# Patient Record
Sex: FEMALE | Race: WHITE | NOT HISPANIC OR LATINO
[De-identification: names, ages, dates, MRNs, and addresses within clinical notes are randomized per-mention and may not be internally consistent; named-entity substitution may affect disease eponyms.]

---

## 2024-03-15 ENCOUNTER — APPOINTMENT (OUTPATIENT)
Dept: PULMONOLOGY | Facility: CLINIC | Age: 64
End: 2024-03-15
Payer: COMMERCIAL

## 2024-03-15 ENCOUNTER — TRANSCRIPTION ENCOUNTER (OUTPATIENT)
Age: 64
End: 2024-03-15

## 2024-03-15 VITALS
WEIGHT: 230 LBS | DIASTOLIC BLOOD PRESSURE: 88 MMHG | HEART RATE: 83 BPM | TEMPERATURE: 98 F | OXYGEN SATURATION: 96 % | BODY MASS INDEX: 36.96 KG/M2 | SYSTOLIC BLOOD PRESSURE: 136 MMHG | HEIGHT: 66 IN

## 2024-03-15 DIAGNOSIS — Z80.6 FAMILY HISTORY OF LEUKEMIA: ICD-10-CM

## 2024-03-15 DIAGNOSIS — Z82.49 FAMILY HISTORY OF ISCHEMIC HEART DISEASE AND OTHER DISEASES OF THE CIRCULATORY SYSTEM: ICD-10-CM

## 2024-03-15 DIAGNOSIS — Z86.79 PERSONAL HISTORY OF OTHER DISEASES OF THE CIRCULATORY SYSTEM: ICD-10-CM

## 2024-03-15 DIAGNOSIS — R09.89 OTHER SPECIFIED SYMPTOMS AND SIGNS INVOLVING THE CIRCULATORY AND RESPIRATORY SYSTEMS: ICD-10-CM

## 2024-03-15 DIAGNOSIS — Z87.898 PERSONAL HISTORY OF OTHER SPECIFIED CONDITIONS: ICD-10-CM

## 2024-03-15 PROBLEM — Z00.00 ENCOUNTER FOR PREVENTIVE HEALTH EXAMINATION: Status: ACTIVE | Noted: 2024-03-15

## 2024-03-15 PROCEDURE — 99205 OFFICE O/P NEW HI 60 MIN: CPT | Mod: 25

## 2024-03-15 RX ORDER — AMLODIPINE BESYLATE 10 MG/1
10 TABLET ORAL
Refills: 0 | Status: ACTIVE | COMMUNITY

## 2024-03-15 RX ORDER — FLUTICASONE PROPIONATE AND SALMETEROL 50; 250 UG/1; UG/1
250-50 POWDER RESPIRATORY (INHALATION)
Refills: 0 | Status: DISCONTINUED | COMMUNITY
End: 2024-03-15

## 2024-03-15 RX ORDER — ALBUTEROL SULFATE 90 UG/1
108 (90 BASE) INHALANT RESPIRATORY (INHALATION)
Qty: 1 | Refills: 5 | Status: ACTIVE | COMMUNITY
Start: 2024-03-15 | End: 1900-01-01

## 2024-03-15 NOTE — CONSULT LETTER
[Dear  ___] : Dear  [unfilled], [Courtesy Letter:] : I had the pleasure of seeing your patient, [unfilled], in my office today. [Please see my note below.] : Please see my note below. [Consult Closing:] : Thank you very much for allowing me to participate in the care of this patient.  If you have any questions, please do not hesitate to contact me. [Sincerely,] : Sincerely, [FreeTextEntry3] : Kavon Quintana,  Pulmonary & Critical Care Medicine [facility]

## 2024-03-15 NOTE — HISTORY OF PRESENT ILLNESS
[Former] : former [Never] : never [TextBox_4] : ~age~yo woman w/ PMH asthma and  WTC exposure who presents for pulmonary evaluation of multiple nodules found during hospitalization in 2023 while undergoing workup of chest pain.   Was hospitalized mid Dec 2023 at [facility] ([facility]) in [place] for CP and underwent workup for NSTEMI including CCTA and CTAPE which revealed multiple solid pulmonary nodules and raised concern of metastatic disease. Per pt she had issues with her troponin level but cardiac workup ultimately was reassuring for non-ischemic disease. She was also found with COVID-19 at that time. She was evaluated by pulmonary, ID and oncology during her hospitalization and there was recommendation she undergo further workup of the nodules including pelvic examinations to assess for occult sources of malignancy. She deferred most of this workup to when she could follow-up with her outpatient PMD.  In the interim, she is up to date on Mammo and PAP screening. Other than briefly smoking as a teenager, she has not smoked cigarettes for the entirety of her adult life. She denies unintentional weight loss or constitutional/B-symptoms. She denies occupational inhalational exposures and denies radon gas exposure. She was exposed to dust during  and says is enrolled in the WTC program but has not been evaluated by providers within the screening program. Denies prior CT chest imaging. Denies personal hx of malignancy. Father  of leukemia but was related to his work developing nuclear weaponry during WW2/Cold War. No lung CA hx in family.   For her asthma, she has been maintained on Advair 250/50 for years which she only uses once a day due to throat irritation and says this generally keeps her controlled. Prior to Advair, she needed to use rescue albuterol multiple times per day. Has not had PFTs in recent memory nor seen pulmonologist for asthma. Has not needed OTONIEL inhaler for years but keeps an old one in her bag. Has not needed oral steroid for asthma in at least 2 years.   SH: Never smoked as an adult, briefly as a teenager Currently works as an  with insurance company Has  WT dust exposure

## 2024-03-15 NOTE — ASSESSMENT
[FreeTextEntry1] : ~age~yo woman w/ PMH asthma and 9/11 WTC exposure who presents for pulmonary evaluation of multiple nodules found during hospitalization in 12/2023 while undergoing workup of chest pain.   Data Reviewed: CCTA ([facility], 12/18/23) - scattered pulmonary nodules measuring up to 0.8cm (RLL), 0.7cm (inferior RUL), patchy lucencies bilaterally suggesting air trapping; no mediastinal or hilar adenopathy CTA PE ([facility], 12/15/23) - no mediastinal or hilar adenopathy, multiple bilateral pulmonary nodules, largest 1.3cm in LLL solid and round, 0.8cm RLL solid, and 0.7cm RUL solid; bilateral air trapping; no PE Epic MyChart data provided by patient (12/2023 hospitalization)  Reviewed images on provided DVD with reports. Multiple bilateral solid nodules with dominant nodule appearing ~1.3cm and other subcentimeter nodules. Reassuringly a non-smoker, however does have WTC dust exposure. These may indeed be benign granulomas i.e. related to WTC dust exposure and to that end she denies constitutional/B-symptoms/unintentional weight loss. Cannot exclude metastasis from distant occult site. Other ddx includes but not limited to bronchogenic granulomatous dz, DIPNECH given presence of airtrapping, sarcoidosis, etc.  Impression: #Multiple pulmonary nodules - cannot exclude malignancy/metastatic disease #Asthma #Pulmonary air trapping  Plan: - extensive image review and prior hospitalization record review during visit today - will seek FDG PET/CT to better assess multiple nodules and eval potential occult sources of malignancy - patient was thoroughly counseled regarding the imaging findings, potential etiologies, necessary workup and investigative studies and potential management options including biopsy or further surveillance and she was in agreement with plan for PET imaging - will eventually need full PFT given her asthma and no recent testing, will order at next f/u visit - switch Advair 250/50 to Breo Ellipta 200/50 1 puff daily with mouth rinsing given her issues taking Advair twice a day - appropriate Ellipta inhaler technique was demonstrated during office visit today - rx for new Albuterol MDI pump provided - will update PMD  RTO after imaging eval and also to get PFT

## 2024-03-27 ENCOUNTER — APPOINTMENT (OUTPATIENT)
Dept: NUCLEAR MEDICINE | Facility: CLINIC | Age: 64
End: 2024-03-27
Payer: COMMERCIAL

## 2024-03-27 PROCEDURE — A9552: CPT

## 2024-03-27 PROCEDURE — 78815 PET IMAGE W/CT SKULL-THIGH: CPT | Mod: PI

## 2024-03-28 ENCOUNTER — TRANSCRIPTION ENCOUNTER (OUTPATIENT)
Age: 64
End: 2024-03-28

## 2024-03-29 ENCOUNTER — APPOINTMENT (OUTPATIENT)
Dept: PULMONOLOGY | Facility: CLINIC | Age: 64
End: 2024-03-29
Payer: COMMERCIAL

## 2024-03-29 ENCOUNTER — NON-APPOINTMENT (OUTPATIENT)
Age: 64
End: 2024-03-29

## 2024-03-29 DIAGNOSIS — R59.0 LOCALIZED ENLARGED LYMPH NODES: ICD-10-CM

## 2024-03-29 PROCEDURE — 99443: CPT

## 2024-04-01 ENCOUNTER — TRANSCRIPTION ENCOUNTER (OUTPATIENT)
Age: 64
End: 2024-04-01

## 2024-04-02 ENCOUNTER — APPOINTMENT (OUTPATIENT)
Dept: THORACIC SURGERY | Facility: CLINIC | Age: 64
End: 2024-04-02
Payer: COMMERCIAL

## 2024-04-02 VITALS
BODY MASS INDEX: 36.96 KG/M2 | HEIGHT: 66 IN | OXYGEN SATURATION: 96 % | HEART RATE: 85 BPM | WEIGHT: 230 LBS | DIASTOLIC BLOOD PRESSURE: 89 MMHG | SYSTOLIC BLOOD PRESSURE: 140 MMHG

## 2024-04-02 DIAGNOSIS — Z80.52 FAMILY HISTORY OF MALIGNANT NEOPLASM OF BLADDER: ICD-10-CM

## 2024-04-02 PROCEDURE — 99205 OFFICE O/P NEW HI 60 MIN: CPT

## 2024-04-03 ENCOUNTER — APPOINTMENT (OUTPATIENT)
Dept: PULMONOLOGY | Facility: CLINIC | Age: 64
End: 2024-04-03

## 2024-04-03 ENCOUNTER — APPOINTMENT (OUTPATIENT)
Dept: PULMONOLOGY | Facility: CLINIC | Age: 64
End: 2024-04-03
Payer: COMMERCIAL

## 2024-04-03 VITALS
HEART RATE: 89 BPM | WEIGHT: 230 LBS | BODY MASS INDEX: 36.96 KG/M2 | DIASTOLIC BLOOD PRESSURE: 86 MMHG | HEIGHT: 66 IN | SYSTOLIC BLOOD PRESSURE: 139 MMHG | OXYGEN SATURATION: 97 %

## 2024-04-03 PROCEDURE — 94729 DIFFUSING CAPACITY: CPT

## 2024-04-03 PROCEDURE — 94060 EVALUATION OF WHEEZING: CPT

## 2024-04-03 PROCEDURE — 94726 PLETHYSMOGRAPHY LUNG VOLUMES: CPT

## 2024-04-03 NOTE — PHYSICAL EXAM
[Restricted in physically strenuous activity but ambulatory and able to carry out work of a light or sedentary nature] : Status 1- Restricted in physically strenuous activity but ambulatory and able to carry out work of a light or sedentary nature, e.g., light house work, office work [General Appearance - Alert] : alert [Sclera] : the sclera and conjunctiva were normal [General Appearance - In No Acute Distress] : in no acute distress [Extraocular Movements] : extraocular movements were intact [PERRL With Normal Accommodation] : pupils were equal in size, round, and reactive to light [Outer Ear] : the ears and nose were normal in appearance [Oropharynx] : the oropharynx was normal [Neck Appearance] : the appearance of the neck was normal [Auscultation Breath Sounds / Voice Sounds] : lungs were clear to auscultation bilaterally [Heart Rate And Rhythm] : heart rate was normal and rhythm regular [Examination Of The Chest] : the chest was normal in appearance [Chest Visual Inspection Thoracic Asymmetry] : no chest asymmetry [Diminished Respiratory Excursion] : normal chest expansion [2+] : left 2+ [Bowel Sounds] : normal bowel sounds [Breast Appearance] : normal in appearance [Abdomen Soft] : soft [Cervical Lymph Nodes Enlarged Posterior Bilaterally] : posterior cervical [Cervical Lymph Nodes Enlarged Anterior Bilaterally] : anterior cervical [Supraclavicular Lymph Nodes Enlarged Bilaterally] : supraclavicular [Axillary Lymph Nodes Enlarged Bilaterally] : axillary [No Spinal Tenderness] : no spinal tenderness [No CVA Tenderness] : no ~M costovertebral angle tenderness [Abnormal Walk] : normal gait [Nail Clubbing] : no clubbing  or cyanosis of the fingernails [Motor Tone] : muscle strength and tone were normal [Musculoskeletal - Swelling] : no joint swelling seen [Skin Turgor] : normal skin turgor [Skin Color & Pigmentation] : normal skin color and pigmentation [] : no rash [Sensation] : the sensory exam was normal to light touch and pinprick [No Focal Deficits] : no focal deficits [Deep Tendon Reflexes (DTR)] : deep tendon reflexes were 2+ and symmetric [Impaired Insight] : insight and judgment were intact [Oriented To Time, Place, And Person] : oriented to person, place, and time [Affect] : the affect was normal [FreeTextEntry1] : Deferred

## 2024-04-03 NOTE — HISTORY OF PRESENT ILLNESS
[FreeTextEntry1] : Ms. MICAELA CARPIO, 63 year old female, nonsmoker, w/ hx of asthma, HTN, hypothyroidism and 9/11 WTC exposure, who presented for lung nodules.  CT angiogram of the chest on12/15/2023: - multiple nodules in both lungs measuring up to 1.3cm - air trapping in both lungs   PET/CT on 3/27/2024: - intense diffuse activity in an enlarged thyroid with calcification in the right lobe and masslike isodense enlargement of the left lobe extending down to the retrosternal region (SUV up to 17.1, image 67) - few mildly avid bilateral cervical chain nodes, the largest measuring 1.4 x 1.0 cm left level 2A (SUV 3.7, image 39). - 1.3 x 1.2 cm left lower lobe nodule with mild activity (SUV 2.4, image 117). - additional scattered bilateral nodules some with mild activity, for example right upper lobe (SUV 2.2, image 93) and some below PET detection. - mosaic attenuation of the lungs suggests air trapping.  PFT scheduled for 4/3/2024  Patient is here for consultation, referred by Dr. Prado (pulm). She reports doing well, denies any CP, cough or worsening SOB.

## 2024-04-03 NOTE — DATA REVIEWED
[FreeTextEntry1] : I have independently reviewed the following: CT angiogram of the chest on12/15/2023 PET/CT on 3/27/2024

## 2024-04-03 NOTE — CONSULT LETTER
[Dear  ___] : Dear  [unfilled], [Consult Letter:] : I had the pleasure of evaluating your patient, [unfilled]. [Consult Closing:] : Thank you very much for allowing me to participate in the care of this patient.  If you have any questions, please do not hesitate to contact me. [Sincerely,] : Sincerely, [FreeTextEntry2] : Dr. Prado (pulm/referring) [FreeTextEntry3] :  Renetta Fraser MD Attending Surgeon Division of Thoracic Surgery , [facility] [employer] [facility]

## 2024-04-03 NOTE — ASSESSMENT
[FreeTextEntry1] : Ms. MICAELA CARPIO, 63 year old female, nonsmoker, w/ hx of asthma, HTN, hypothyroidism and 9/11 WTC exposure, who presented for lung nodules.  CT angiogram of the chest on12/15/2023: - multiple nodules in both lungs measuring up to 1.3cm - air trapping in both lungs   PET/CT on 3/27/2024: - intense diffuse activity in an enlarged thyroid with calcification in the right lobe and masslike isodense enlargement of the left lobe extending down to the retrosternal region (SUV up to 17.1, image 67) - few mildly avid bilateral cervical chain nodes, the largest measuring 1.4 x 1.0 cm left level 2A (SUV 3.7, image 39). - 1.3 x 1.2 cm left lower lobe nodule with mild activity (SUV 2.4, image 117). - additional scattered bilateral nodules some with mild activity, for example right upper lobe (SUV 2.2, image 93) and some below PET detection. - mosaic attenuation of the lungs suggests air trapping.  PFT scheduled for 4/3/2024  I have reviewed the patient's medical records and diagnostic images at time of this office consultation and have made the following recommendation: 1. PET results reviewed and discussed with patient, left lung nodule suspicious for malignancy. Recommended Lt. VATS R.A. CLEO wedge resection with IR marking of CLEO (series 4, image 80). LLL wedge possible segmentectomy. Risks of surgery includes but not limited to pain, infection, bleeding, injuries to surrounding structures, and need for further procedure, stroke or death. Benefits and alternatives explained to patient, all questions answered, patient agreed to proceed with surgery. 2. PFT to assess for lung function (scheduled for 4/3/2024) 3. Cardiac clearance prior to surgery.

## 2024-04-04 ENCOUNTER — TRANSCRIPTION ENCOUNTER (OUTPATIENT)
Age: 64
End: 2024-04-04

## 2024-04-18 ENCOUNTER — APPOINTMENT (OUTPATIENT)
Dept: OTOLARYNGOLOGY | Facility: CLINIC | Age: 64
End: 2024-04-18
Payer: COMMERCIAL

## 2024-04-18 VITALS
SYSTOLIC BLOOD PRESSURE: 147 MMHG | BODY MASS INDEX: 36.96 KG/M2 | HEIGHT: 66 IN | DIASTOLIC BLOOD PRESSURE: 86 MMHG | WEIGHT: 230 LBS | HEART RATE: 69 BPM

## 2024-04-18 DIAGNOSIS — Z78.9 OTHER SPECIFIED HEALTH STATUS: ICD-10-CM

## 2024-04-18 DIAGNOSIS — R94.02 ABNORMAL BRAIN SCAN: ICD-10-CM

## 2024-04-18 DIAGNOSIS — Z83.3 FAMILY HISTORY OF DIABETES MELLITUS: ICD-10-CM

## 2024-04-18 DIAGNOSIS — F12.91 CANNABIS USE, UNSPECIFIED, IN REMISSION: ICD-10-CM

## 2024-04-18 PROCEDURE — 99204 OFFICE O/P NEW MOD 45 MIN: CPT

## 2024-04-18 NOTE — HISTORY OF PRESENT ILLNESS
[de-identified] : This is a 63 yro patient with HTN, asthma, hypothyroidism and h/o 9/11 WTC exposure, referred by Dr. Frsaer for eval of thyroid findings on PET/CT done for workup of lung nodules. Reports dysphagia with solid foods for about 6 months. She feels like there's not enough room for the food and air at the same time. Throat feels tight when swallowing and causes cough. Eating slowly is better. Swallowing liquids without issues. No odynophagia, aspiration, dysphonia, or dyspnea. No fever, chills, or weight loss. Patient denies h/o radiation and has no family h/o thyroid cancer. No thyroid biopsy done.  Taking Synthroid 50mcg daily.  Denies h/o smoking. Social alcohol use.   Scheduled for lung surgery with Dr. Fraser on 5/6/2024.   PET 3/27/2024: IMPRESSION: 1.  1.3 x 1.2 cm mildly avid left lower lobe nodule concerning for malignancy. Additional scattered bilateral nodules with variable mild activity.  2.  Intensely FDG avid enlarged thyroid gland with calcification in the right lobe and masslike isodensity in the left lobe. Further evaluation with ultrasound/percutaneous needle biopsy recommended as clinically indicated.  3.  Mildly avid bilateral cervical chain nodes, nonspecific.

## 2024-04-18 NOTE — PHYSICAL EXAM
[de-identified] : Bilateral thyroid nodule. [Midline] : trachea located in midline position [Normal] : no rashes

## 2024-04-18 NOTE — PLAN
[TextEntry] : - H/O diffuse FDG avidity in the thyroid gland on a recent PET CT for lung nodule. - Scan reviewed today. US in the office today showed several isoechoic thyroid nodules, larger on the L side. Prominent LNs with normal features. - Discussed findings with the patient and her  today and recommended USFNA. - Patient will have her lung nodule resected by Dr Fraser in early May and I will wait for the results to request the test. Seems like the uptake is 2/2 Hashimoto`s thyroiditis. - Return in 6  weeks.

## 2024-04-18 NOTE — CONSULT LETTER
[Dear  ___] : Dear  [unfilled], [Consult Letter:] : I had the pleasure of evaluating your patient, [unfilled]. [Please see my note below.] : Please see my note below. [Consult Closing:] : Thank you very much for allowing me to participate in the care of this patient.  If you have any questions, please do not hesitate to contact me. [Sincerely,] : Sincerely, [FreeTextEntry2] : Dr Renetta Fraser [FreeTextEntry3] :  Elijah Jimenez MD, FACS  Otolaryngology-Head and Neck Surgery [place] [employer] at [facility]

## 2024-04-22 ENCOUNTER — TRANSCRIPTION ENCOUNTER (OUTPATIENT)
Age: 64
End: 2024-04-22

## 2024-04-23 ENCOUNTER — TRANSCRIPTION ENCOUNTER (OUTPATIENT)
Age: 64
End: 2024-04-23

## 2024-04-26 ENCOUNTER — APPOINTMENT (OUTPATIENT)
Dept: ENDOCRINOLOGY | Facility: CLINIC | Age: 64
End: 2024-04-26
Payer: COMMERCIAL

## 2024-04-26 VITALS
HEIGHT: 66 IN | OXYGEN SATURATION: 96 % | SYSTOLIC BLOOD PRESSURE: 141 MMHG | DIASTOLIC BLOOD PRESSURE: 94 MMHG | WEIGHT: 225 LBS | HEART RATE: 88 BPM | BODY MASS INDEX: 36.16 KG/M2 | TEMPERATURE: 97.8 F | RESPIRATION RATE: 18 BRPM

## 2024-04-26 DIAGNOSIS — E03.9 HYPOTHYROIDISM, UNSPECIFIED: ICD-10-CM

## 2024-04-26 DIAGNOSIS — E07.9 DISORDER OF THYROID, UNSPECIFIED: ICD-10-CM

## 2024-04-26 PROCEDURE — 99204 OFFICE O/P NEW MOD 45 MIN: CPT

## 2024-04-26 PROCEDURE — G2211 COMPLEX E/M VISIT ADD ON: CPT | Mod: NC,1L

## 2024-04-26 RX ORDER — LEVOTHYROXINE SODIUM 0.05 MG/1
50 TABLET ORAL
Qty: 108 | Refills: 1 | Status: ACTIVE | COMMUNITY
Start: 1900-01-01 | End: 1900-01-01

## 2024-04-26 NOTE — HISTORY OF PRESENT ILLNESS
[FreeTextEntry1] : This is a 63 yro patient with HTN, asthma, hypothyroidism and h/o 9/11 WTC exposure, referred by Dr. Fraser for eval of thyroid findings on PET/CT done for workup of lung nodules.  PET 3/27/2024: Intensely FDG avid enlarged thyroid gland with calcification in the right lobe and masslike isodensity in the left lobe. Further evaluation with ultrasound/percutaneous needle biopsy recommended as clinically indicated.  Scheduled for lung surgery with Dr. Fraser on 5/6/2024.  If eats too quickly starts to cough. Feels like there isn't enough room for both food to pass and air to come up when swallowing. No throat pain. No choking or SOB when on her back. No changes to voice. Saw Dr. Jimenez (ENT) recently. Saw some isoechoic nodules on thyroid gland on POCUS. Patient will f/u with Dr. Jimenez in 6 weeks. May do US-guided FNA at that time. Waiting for lung procedure first.  Taking Synthroid 50mcg PO daily on empty stomach, has been on this dose for years. Takes Vit D only. No use of supplements otherwise, lithium, amiodarone, hx of radiation to head or neck. No prior MENG, thyroid surgery or prior treatment of hyperthyroidism. No hx of CAD, arrhythmia, CHF.  Has cold intolerance. Energy is low. Has been like this for a while. No recent weight change. Normal BMs.   PMHx: As above. PSHx: None Meds: Synthroid, vit d, amlodipine, breo ellipta FHx: No known FHx of thyroid disease.  Dec 2023 labs showed TSH 5.280 and FT4 0.86. April 2024 labs reviewed.  Electrolytes normal.  TSH mildly elevated at 5.41.  No free T4 available.  Thyroglobulin antibodies negative.

## 2024-04-26 NOTE — PHYSICAL EXAM
[Alert] : alert [Well Nourished] : well nourished [No Acute Distress] : no acute distress [EOMI] : extra ocular movement intact [No Proptosis] : no proptosis [No Lid Lag] : no lid lag [Supple] : the neck was supple [No Respiratory Distress] : no respiratory distress [No Accessory Muscle Use] : no accessory muscle use [Normal Rate and Effort] : normal respiratory rate and effort [Normal Rate] : heart rate was normal [Regular Rhythm] : with a regular rhythm [Not Tender] : non-tender [Soft] : abdomen soft [No Spinal Tenderness] : no spinal tenderness [Spine Straight] : spine straight [Kyphosis] : no kyphosis present [Cranial Nerves Intact] : cranial nerves 2-12 were intact [No Motor Deficits] : the motor exam was normal [Oriented x3] : oriented to person, place, and time [Normal Affect] : the affect was normal [Normal Mood] : the mood was normal [de-identified] : Multiple nodules b/l, mobile. Largest ~2cm by palpation on left, normal consistency. [de-identified] : Trace pitting edema b/l LE.

## 2024-04-26 NOTE — ASSESSMENT
[FreeTextEntry1] : This is a 63 yro patient with HTN, asthma, hypothyroidism and h/o 9/11 WTC exposure, referred by Dr. Fraser for eval of thyroid findings on PET/CT done for workup of lung nodules.  MNG goiter Going for lung surgery in May 2024. Will then have US-guided FNA with ENT Dr. Barbara hodge. Increased FDG uptake may be from hashimoto's  Hypothyroidism Dec 2023 labs showed TSH 5.280 and FT4 0.86. April 2024 labs reviewed.  Electrolytes normal.  TSH mildly elevated at 5.41.  No free T4 available.  Thyroglobulin antibodies negative. -Increase levothyroxine to 50mcg daily with two tabs on Sat and Sun -Repeat TFTs in 6 weeks.  RTC 3 months.  David Tipton DO

## 2024-04-30 ENCOUNTER — APPOINTMENT (OUTPATIENT)
Dept: INTERVENTIONAL RADIOLOGY/VASCULAR | Facility: CLINIC | Age: 64
End: 2024-04-30

## 2024-04-30 VITALS — HEIGHT: 66 IN | WEIGHT: 230 LBS | BODY MASS INDEX: 36.96 KG/M2

## 2024-04-30 DIAGNOSIS — R91.8 OTHER NONSPECIFIC ABNORMAL FINDING OF LUNG FIELD: ICD-10-CM

## 2024-04-30 PROCEDURE — XXXXX: CPT | Mod: 1L

## 2024-05-01 ENCOUNTER — OUTPATIENT (OUTPATIENT)
Dept: OUTPATIENT SERVICES | Facility: HOSPITAL | Age: 64
LOS: 1 days | End: 2024-05-01
Payer: COMMERCIAL

## 2024-05-01 ENCOUNTER — NON-APPOINTMENT (OUTPATIENT)
Age: 64
End: 2024-05-01

## 2024-05-01 VITALS
DIASTOLIC BLOOD PRESSURE: 87 MMHG | TEMPERATURE: 98 F | SYSTOLIC BLOOD PRESSURE: 135 MMHG | WEIGHT: 227.96 LBS | OXYGEN SATURATION: 97 % | HEIGHT: 65 IN | RESPIRATION RATE: 18 BRPM | HEART RATE: 74 BPM

## 2024-05-01 DIAGNOSIS — R91.8 OTHER NONSPECIFIC ABNORMAL FINDING OF LUNG FIELD: ICD-10-CM

## 2024-05-01 DIAGNOSIS — Z91.89 OTHER SPECIFIED PERSONAL RISK FACTORS, NOT ELSEWHERE CLASSIFIED: ICD-10-CM

## 2024-05-01 DIAGNOSIS — Z98.51 TUBAL LIGATION STATUS: Chronic | ICD-10-CM

## 2024-05-01 LAB
BLD GP AB SCN SERPL QL: POSITIVE — SIGNIFICANT CHANGE UP
RH IG SCN BLD-IMP: POSITIVE — SIGNIFICANT CHANGE UP
RH IG SCN BLD-IMP: POSITIVE — SIGNIFICANT CHANGE UP

## 2024-05-01 PROCEDURE — 86077 PHYS BLOOD BANK SERV XMATCH: CPT

## 2024-05-01 RX ORDER — SODIUM CHLORIDE 9 MG/ML
1000 INJECTION, SOLUTION INTRAVENOUS
Refills: 0 | Status: DISCONTINUED | OUTPATIENT
Start: 2024-05-06 | End: 2024-05-07

## 2024-05-01 NOTE — H&P PST ADULT - GENITOURINARY MALE
PAST MEDICAL HISTORY:  Left inguinal hernia     Lumbar herniated disc L2-5    Osteoarthritis right hip    Prediabetes     Sebaceous cyst left anterior chest     not applicable

## 2024-05-01 NOTE — H&P PST ADULT - REASON FOR ADMISSION
" In my lung there are nodule, Dr. Fraser is going to remove that and send it to pathology, in addition to that the radiologist will be marking one of the smallest nodule" " In my lung there are nodules, Dr. Fraser is going to remove that and send it to pathology, in addition to that the radiologist will be marking one of the smallest nodule"

## 2024-05-01 NOTE — PLAN
[TextEntry] :  *Patient was given pre op instructions at today's visit.*No eating after midnight the night before. *Please bring your ID and insurance card with you on the day of procedure. *Please leave all jewelry and valuables at home on the day of procedure.

## 2024-05-01 NOTE — H&P PST ADULT - PROBLEM SELECTOR PLAN 1
Schedule for left Video Assisted Thoracoscopic surgery (VATS), robotic assisted, left upper lobe wedge resection with IR marking left upper lobe Series 4, image 80, left lower lobe wedge resection, possible segmentectomy, mediastinal lymph node dissection tentatively on 05/06/24. Pre op instructions, famotidine, chlorhexidine gluconate soap given and explained. Pt verbalized understanding. Schedule for left Video Assisted Thoracoscopic surgery (VATS), robotic assisted, left upper lobe wedge resection with IR marking left upper lobe Series 4, image 80, left lower lobe wedge resection, possible segmentectomy, mediastinal lymph node dissection tentatively on 05/06/24. Pre op instructions, famotidine, chlorhexidine gluconate soap given and explained. Pt verbalized understanding.    ** CBC, BMP (04/06/24) in HIE**  T&S/ABO done at PST

## 2024-05-01 NOTE — H&P PST ADULT - NEGATIVE MUSCULOSKELETAL SYMPTOMS
no joint swelling/no myalgia/no muscle cramps/no muscle weakness/no stiffness/no neck pain/no back pain/no leg pain R

## 2024-05-01 NOTE — H&P PST ADULT - HISTORY OF PRESENT ILLNESS
62 y/o F with H/O: HTN, Asthma, Hypothyroidism   Covid end of Dec, 2023 C/O Chest adm to the hosp. in [place] - S/P Ct scan revealed lung nodules  62 y/o F with H/O: HTN, Asthma (pt denies prior hospitalizations or intubation), Hypothyroidism, Obesity presents to [facility] for pre op evaluation with C/O chest pain s/p Covid infections the end of Dec, 2023. Pt was admitted to the hosp. in [place] - S/P Ct scan revealed lung nodules. Pre op diagnosis: other nonspecific abnormal finding of lung field. Now schedule for left Video Assisted Thoracoscopic surgery (VATS), robotic assisted, left upper lobe wedge resection with IR marking left upper lobe series 4, image 80, left lower lobe wedge resection, possible segmentectomy, mediastinal lymph node dissection tentatively on 05/06/24 62 y/o F with H/O: HTN, Asthma (pt denies prior hospitalizations or intubation), Hypothyroidism, Obesity presents to [facility] for pre op evaluation with C/O chest pain s/p Covid infections the end of Dec, 2023. Pt was admitted to the hosp. in [place]. Pre op diagnosis: other nonspecific abnormal finding of lung field. Now schedule for left Video Assisted Thoracoscopic surgery (VATS), robotic assisted, left upper lobe wedge resection with IR marking left upper lobe series 4, image 80, left lower lobe wedge resection, possible segmentectomy, mediastinal lymph node dissection tentatively on 05/06/24.    CT angiogram of the chest on12/15/2023:  - multiple nodules in both lungs measuring up to 1.3cm  - air trapping in both lungs  ?  PET/CT on 3/27/2024:  - intense diffuse activity in an enlarged thyroid with calcification in the right lobe and masslike isodense enlargement of the left lobe extending down to the retrosternal region (SUV up to 17.1, image 67)  - few mildly avid bilateral cervical chain nodes, the largest measuring 1.4 x 1.0 cm left level 2A (SUV 3.7, image 39).  - 1.3 x 1.2 cm left lower lobe nodule with mild activity (SUV 2.4, image 117).  - additional scattered bilateral nodules some with mild activity, for example right upper lobe (SUV 2.2, image 93) and some below PET detection.  - mosaic attenuation of the lungs suggests air trapping.

## 2024-05-01 NOTE — H&P PST ADULT - NEGATIVE ENMT SYMPTOMS
no hearing difficulty/no ear pain/no tinnitus/no vertigo/no sinus symptoms/no nasal congestion/no nasal discharge/no nose bleeds/no recurrent cold sores/no abnormal taste sensation/no dry mouth/no throat pain

## 2024-05-01 NOTE — ASSESSMENT
[FreeTextEntry1] : 63 year old female found to have multiple left lung nodules referred for lung marking prior to planned left VATS with Dr. Fraser. Images reviewed with patient all questions answered. Two lesions, on in left upper lobe and one in left lower lobe planned for resection. Confirmed with Dr. Fraser, left upper lobe lesion would be marked. The left lower lobe lesion would not need marking for safe resection.  Imaging and chart reviewed.  Patient is appropriate candidate for lung marking of left upper lobe lesion.  Will plan for procedure with sedation.  Preprocedure instructions given.   Discussed procedure details in length. All questions answered.  Modality: CT Position: Prone Anesthesia: Sedation Imaging: PET CT 3/27/2024 Series 4 image 80, site marked on PET

## 2024-05-01 NOTE — H&P PST ADULT - OTHER CARE PROVIDERS
Dr. Rafael Mathew (Cardio) [phone] Dr. Rafael Mathew (Cardio) [phone]; Dr. Kavon Quintana (Pulm) [phone]; David Rodriguez (Endo) [phone]

## 2024-05-01 NOTE — HISTORY OF PRESENT ILLNESS
[Home] : at home, [unfilled] , at the time of the visit. [Medical Office: ([facility])___] : at the medical office located in  [Verbal consent obtained from patient] : the patient, [unfilled] [FreeTextEntry1] : Patient is a 63 year old female with a past medical history of asthma, HTN, hypothyroidism and 9/11 WTC exposure. She presented to [facility] for lung nodule and has now been referred to IR by Dr. Fraser for consultation regarding lung marking procedure to be done prior to patients Lt. VATS R.A. CLEO wedge resection by Dr. Fraser. Patient has CT angio done in December 2023 which demonstrate multiple lung nodules.   Patient denies recent fever, chills, shortness of breath, chest pain, nausea, vomiting or diarrhea   PET/CT on 3/27/2024: intense diffuse activity in an enlarged thyroid with calcification in the right lobe and masslike isodense enlargement of the left lobe extending down to the retrosternal region (SUV up to 17.1, image 67) few mildly avid bilateral cervical chain nodes, the largest measuring 1.4 x 1.0 cm left level 2A (SUV 3.7, image 39). 1.3 x 1.2 cm left lower lobe nodule with mild activity (SUV 2.4, image 117). additional scattered bilateral nodules some with mild activity, for example right upper lobe (SUV 2.2, image 93) and some below PET detection. mosaic attenuation of the lungs suggests air trapping."  PStTscheduled for 05/01/24.

## 2024-05-01 NOTE — H&P PST ADULT - ATTENDING COMMENTS
patient seen and examined - plan for robotic left lung resection via vats, possible open with IR marking of CLEO lesion and plan for wedge LLL. discussed risks of surgery including and not limited to bleeding, infection, injury to surrounding structures, prolonged air leak, afib, post op pneumonia, dvt, cardiac event. all questions answered, patient expressed understanding and agreeable to proceed.

## 2024-05-01 NOTE — H&P PST ADULT - MUSCULOSKELETAL
details… ROM intact/no joint erythema/no joint warmth/no calf tenderness/normal gait/strength 5/5 bilateral upper extremities

## 2024-05-01 NOTE — H&P PST ADULT - NSANTHOSAYNRD_GEN_A_CORE
METS 9.89 as per Dasi score/No. LUCY screening performed.  STOP BANG Legend: 0-2 = LOW Risk; 3-4 = INTERMEDIATE Risk; 5-8 = HIGH Risk No. LUCY screening performed.  STOP BANG Legend: 0-2 = LOW Risk; 3-4 = INTERMEDIATE Risk; 5-8 = HIGH Risk

## 2024-05-01 NOTE — H&P PST ADULT - NSICDXPASTMEDICALHX_GEN_ALL_CORE_FT
PAST MEDICAL HISTORY:  Asthma     HTN (hypertension)     Hypothyroidism      PAST MEDICAL HISTORY:  Asthma     HTN (hypertension)     Hypothyroidism     Obesity

## 2024-05-02 ENCOUNTER — NON-APPOINTMENT (OUTPATIENT)
Age: 64
End: 2024-05-02

## 2024-05-03 NOTE — ASU PATIENT PROFILE, ADULT - FALL HARM RISK - UNIVERSAL INTERVENTIONS
Bed in lowest position, wheels locked, appropriate side rails in place/Call bell, personal items and telephone in reach/Instruct patient to call for assistance before getting out of bed or chair/Non-slip footwear when patient is out of bed/[place] to call system/Physically safe environment - no spills, clutter or unnecessary equipment/Purposeful Proactive Rounding/Room/bathroom lighting operational, light cord in reach

## 2024-05-05 ENCOUNTER — TRANSCRIPTION ENCOUNTER (OUTPATIENT)
Age: 64
End: 2024-05-05

## 2024-05-06 ENCOUNTER — TRANSCRIPTION ENCOUNTER (OUTPATIENT)
Age: 64
End: 2024-05-06

## 2024-05-06 ENCOUNTER — RESULT REVIEW (OUTPATIENT)
Age: 64
End: 2024-05-06

## 2024-05-06 ENCOUNTER — INPATIENT (INPATIENT)
Facility: HOSPITAL | Age: 64
LOS: 1 days | Discharge: HOME | End: 2024-05-08
Attending: STUDENT IN AN ORGANIZED HEALTH CARE EDUCATION/TRAINING PROGRAM | Admitting: STUDENT IN AN ORGANIZED HEALTH CARE EDUCATION/TRAINING PROGRAM
Payer: COMMERCIAL

## 2024-05-06 ENCOUNTER — APPOINTMENT (OUTPATIENT)
Dept: THORACIC SURGERY | Facility: HOSPITAL | Age: 64
End: 2024-05-06

## 2024-05-06 VITALS
SYSTOLIC BLOOD PRESSURE: 131 MMHG | HEIGHT: 65 IN | OXYGEN SATURATION: 99 % | TEMPERATURE: 98 F | DIASTOLIC BLOOD PRESSURE: 87 MMHG | RESPIRATION RATE: 16 BRPM | HEART RATE: 77 BPM | WEIGHT: 227.96 LBS

## 2024-05-06 DIAGNOSIS — Z98.51 TUBAL LIGATION STATUS: Chronic | ICD-10-CM

## 2024-05-06 DIAGNOSIS — R91.8 OTHER NONSPECIFIC ABNORMAL FINDING OF LUNG FIELD: ICD-10-CM

## 2024-05-06 PROCEDURE — 32666 THORACOSCOPY W/WEDGE RESECT: CPT | Mod: LT,59

## 2024-05-06 PROCEDURE — 88331 PATH CONSLTJ SURG 1 BLK 1SPC: CPT | Mod: 26

## 2024-05-06 PROCEDURE — 88342 IMHCHEM/IMCYTCHM 1ST ANTB: CPT | Mod: 26

## 2024-05-06 PROCEDURE — 88341 IMHCHEM/IMCYTCHM EA ADD ANTB: CPT | Mod: 26

## 2024-05-06 PROCEDURE — 88305 TISSUE EXAM BY PATHOLOGIST: CPT | Mod: 26

## 2024-05-06 PROCEDURE — 86079 PHYS BLOOD BANK SERV AUTHRJ: CPT

## 2024-05-06 PROCEDURE — 32674 THORACOSCOPY LYMPH NODE EXC: CPT | Mod: AS

## 2024-05-06 PROCEDURE — 32669 THORACOSCOPY REMOVE SEGMENT: CPT | Mod: 22,LT

## 2024-05-06 PROCEDURE — 99233 SBSQ HOSP IP/OBS HIGH 50: CPT

## 2024-05-06 PROCEDURE — 88307 TISSUE EXAM BY PATHOLOGIST: CPT | Mod: 26

## 2024-05-06 PROCEDURE — 32669 THORACOSCOPY REMOVE SEGMENT: CPT | Mod: AS,LT

## 2024-05-06 PROCEDURE — S2900 ROBOTIC SURGICAL SYSTEM: CPT | Mod: NC

## 2024-05-06 PROCEDURE — 71045 X-RAY EXAM CHEST 1 VIEW: CPT | Mod: 26

## 2024-05-06 PROCEDURE — 88309 TISSUE EXAM BY PATHOLOGIST: CPT | Mod: 26

## 2024-05-06 PROCEDURE — 32674 THORACOSCOPY LYMPH NODE EXC: CPT

## 2024-05-06 PROCEDURE — 88332 PATH CONSLTJ SURG EA ADD BLK: CPT | Mod: 26

## 2024-05-06 PROCEDURE — 32667 THORACOSCOPY W/W RESECT ADDL: CPT | Mod: LT,59

## 2024-05-06 PROCEDURE — 32666 THORACOSCOPY W/WEDGE RESECT: CPT | Mod: AS,LT,59

## 2024-05-06 PROCEDURE — 32999 UNLISTED PX LUNGS & PLEURA: CPT

## 2024-05-06 DEVICE — STAPLER COVIDIEN SIGNIA 30MM SHORT GRAY RELOAD: Type: IMPLANTABLE DEVICE | Status: FUNCTIONAL

## 2024-05-06 DEVICE — STAPLER COVIDIEN TRI-STAPLE CURVED 45MM TAN RELOAD: Type: IMPLANTABLE DEVICE | Status: FUNCTIONAL

## 2024-05-06 DEVICE — PROGEL PLEURAL AIR LEAK 4ML: Type: IMPLANTABLE DEVICE | Status: FUNCTIONAL

## 2024-05-06 DEVICE — SURGICEL 2 X 14": Type: IMPLANTABLE DEVICE | Status: FUNCTIONAL

## 2024-05-06 DEVICE — LIGATING CLIPS WECK HORIZON SMALL-WIDE (RED) 24: Type: IMPLANTABLE DEVICE | Status: FUNCTIONAL

## 2024-05-06 DEVICE — CHEST DRAIN THORACIC ARGYLE PVC 28FR STRAIGHT: Type: IMPLANTABLE DEVICE | Status: FUNCTIONAL

## 2024-05-06 DEVICE — STAPLER COVIDIEN TRI-STAPLE 60MM PURPLE RELOAD: Type: IMPLANTABLE DEVICE | Status: FUNCTIONAL

## 2024-05-06 DEVICE — STAPLER COVIDIEN TRI-STAPLE 60MM BLACK RELOAD: Type: IMPLANTABLE DEVICE | Status: FUNCTIONAL

## 2024-05-06 DEVICE — ARISTA 3GR: Type: IMPLANTABLE DEVICE | Status: FUNCTIONAL

## 2024-05-06 DEVICE — STAPLER COVIDIEN TRI-STAPLE 30MM PURPLE RELOAD: Type: IMPLANTABLE DEVICE | Status: FUNCTIONAL

## 2024-05-06 RX ORDER — HYDROMORPHONE HYDROCHLORIDE 2 MG/ML
30 INJECTION INTRAMUSCULAR; INTRAVENOUS; SUBCUTANEOUS
Refills: 0 | Status: DISCONTINUED | OUTPATIENT
Start: 2024-05-06 | End: 2024-05-07

## 2024-05-06 RX ORDER — HEPARIN SODIUM 5000 [USP'U]/ML
5000 INJECTION INTRAVENOUS; SUBCUTANEOUS EVERY 8 HOURS
Refills: 0 | Status: DISCONTINUED | OUTPATIENT
Start: 2024-05-06 | End: 2024-05-08

## 2024-05-06 RX ORDER — HYDROMORPHONE HYDROCHLORIDE 2 MG/ML
0.5 INJECTION INTRAMUSCULAR; INTRAVENOUS; SUBCUTANEOUS
Refills: 0 | Status: DISCONTINUED | OUTPATIENT
Start: 2024-05-06 | End: 2024-05-07

## 2024-05-06 RX ORDER — AMLODIPINE BESYLATE 2.5 MG/1
1 TABLET ORAL
Refills: 0 | DISCHARGE

## 2024-05-06 RX ORDER — LEVOTHYROXINE SODIUM 125 MCG
50 TABLET ORAL DAILY
Refills: 0 | Status: DISCONTINUED | OUTPATIENT
Start: 2024-05-06 | End: 2024-05-08

## 2024-05-06 RX ORDER — ONDANSETRON 8 MG/1
4 TABLET, FILM COATED ORAL EVERY 6 HOURS
Refills: 0 | Status: DISCONTINUED | OUTPATIENT
Start: 2024-05-06 | End: 2024-05-08

## 2024-05-06 RX ORDER — BUDESONIDE AND FORMOTEROL FUMARATE DIHYDRATE 160; 4.5 UG/1; UG/1
2 AEROSOL RESPIRATORY (INHALATION)
Refills: 0 | Status: DISCONTINUED | OUTPATIENT
Start: 2024-05-06 | End: 2024-05-08

## 2024-05-06 RX ORDER — NALOXONE HYDROCHLORIDE 4 MG/.1ML
0.1 SPRAY NASAL
Refills: 0 | Status: DISCONTINUED | OUTPATIENT
Start: 2024-05-06 | End: 2024-05-08

## 2024-05-06 RX ORDER — ACETAMINOPHEN 500 MG
1000 TABLET ORAL ONCE
Refills: 0 | Status: COMPLETED | OUTPATIENT
Start: 2024-05-06 | End: 2024-05-06

## 2024-05-06 RX ORDER — ACETAMINOPHEN 500 MG
975 TABLET ORAL ONCE
Refills: 0 | Status: COMPLETED | OUTPATIENT
Start: 2024-05-06 | End: 2024-05-06

## 2024-05-06 RX ORDER — ACETAMINOPHEN 500 MG
1000 TABLET ORAL EVERY 6 HOURS
Refills: 0 | Status: COMPLETED | OUTPATIENT
Start: 2024-05-07 | End: 2024-05-07

## 2024-05-06 RX ORDER — LEVOTHYROXINE SODIUM 125 MCG
1 TABLET ORAL
Refills: 0 | DISCHARGE

## 2024-05-06 RX ORDER — HEPARIN SODIUM 5000 [USP'U]/ML
7500 INJECTION INTRAVENOUS; SUBCUTANEOUS ONCE
Refills: 0 | Status: COMPLETED | OUTPATIENT
Start: 2024-05-06 | End: 2024-05-06

## 2024-05-06 RX ORDER — SENNA PLUS 8.6 MG/1
2 TABLET ORAL AT BEDTIME
Refills: 0 | Status: DISCONTINUED | OUTPATIENT
Start: 2024-05-06 | End: 2024-05-08

## 2024-05-06 RX ORDER — GABAPENTIN 400 MG/1
300 CAPSULE ORAL ONCE
Refills: 0 | Status: COMPLETED | OUTPATIENT
Start: 2024-05-06 | End: 2024-05-06

## 2024-05-06 RX ORDER — POLYETHYLENE GLYCOL 3350 17 G/17G
17 POWDER, FOR SOLUTION ORAL DAILY
Refills: 0 | Status: DISCONTINUED | OUTPATIENT
Start: 2024-05-06 | End: 2024-05-08

## 2024-05-06 RX ORDER — DIPHENHYDRAMINE HCL 50 MG
25 CAPSULE ORAL EVERY 4 HOURS
Refills: 0 | Status: DISCONTINUED | OUTPATIENT
Start: 2024-05-06 | End: 2024-05-08

## 2024-05-06 RX ORDER — FLUTICASONE FUROATE AND VILANTEROL TRIFENATATE 100; 25 UG/1; UG/1
1 POWDER RESPIRATORY (INHALATION)
Refills: 0 | DISCHARGE

## 2024-05-06 RX ADMIN — Medication 400 MILLIGRAM(S): at 18:51

## 2024-05-06 RX ADMIN — Medication 1000 MILLIGRAM(S): at 19:30

## 2024-05-06 RX ADMIN — HYDROMORPHONE HYDROCHLORIDE 30 MILLILITER(S): 2 INJECTION INTRAMUSCULAR; INTRAVENOUS; SUBCUTANEOUS at 19:20

## 2024-05-06 RX ADMIN — SODIUM CHLORIDE 30 MILLILITER(S): 9 INJECTION, SOLUTION INTRAVENOUS at 18:51

## 2024-05-06 RX ADMIN — Medication 975 MILLIGRAM(S): at 12:41

## 2024-05-06 RX ADMIN — GABAPENTIN 300 MILLIGRAM(S): 400 CAPSULE ORAL at 12:41

## 2024-05-06 RX ADMIN — HEPARIN SODIUM 7500 UNIT(S): 5000 INJECTION INTRAVENOUS; SUBCUTANEOUS at 12:48

## 2024-05-06 RX ADMIN — HEPARIN SODIUM 5000 UNIT(S): 5000 INJECTION INTRAVENOUS; SUBCUTANEOUS at 21:27

## 2024-05-06 RX ADMIN — BUDESONIDE AND FORMOTEROL FUMARATE DIHYDRATE 2 PUFF(S): 160; 4.5 AEROSOL RESPIRATORY (INHALATION) at 22:39

## 2024-05-06 RX ADMIN — SENNA PLUS 2 TABLET(S): 8.6 TABLET ORAL at 21:30

## 2024-05-06 RX ADMIN — HYDROMORPHONE HYDROCHLORIDE 30 MILLILITER(S): 2 INJECTION INTRAMUSCULAR; INTRAVENOUS; SUBCUTANEOUS at 18:52

## 2024-05-06 NOTE — BRIEF OPERATIVE NOTE - OPERATION/FINDINGS
FB, Robot-assisted LVATS LLL medial basilar segmentectomy, CLEO wedge resection, pleural biopsy, MLND

## 2024-05-06 NOTE — BRIEF OPERATIVE NOTE - FIRST ASSIST PARTICIPATION DETAILS - (COMPONENTS OF THE PROCEDURE THAT ASSISTANT PARTICIPATED IN)
Behavioral Health IP Nursing Progress Note    Suicidal Ideation:  Pt denies      Current C-SSRS score: Negative - White     Protective Factors / Reason for Living: see Safety Plan  Interventions:   · 15-minute safety rounding  · Safety Plan initiated; reviewed  · Coping strategies encouraged    Other Interventions Implemented:  · Support provided      Shift Narrative: Objective/Subjective; PRN medications:    Pt observed on unit up ad yayo, socializing with peers and utilizing distraction techniques. Writer notes an increase in socialization throughout the shift. Pt present with anxious and irritable affect.  Writer noticed pt exhibiting increased socialization and improved mood (laughing and approaching staff more comfortably) while on the milieu.  Pt approaches writer several times throughout this shift requesting snacks or to ask questions about health.  Pt denies SI/HI/AVH but writer believes pt may be minimizing due to preoccupied presentation.    Pt displays delusional and grandiose speech believing \"that he is a boxer,\" and fixated on \"beating people up\" and defending himself physically.  Pt expresses to writer that \"sometimes he lies so people don't think he is a hard worker.\"  Pt expresses excitement regarding discharge so he can work hard and care for his significant other.  Pt approached writer later in the night stating \"he needs melatonin.\"  Writer explained an order was necessary, and encouraged consultation with provider in the morning.  Pt appear visibly anxious/ restless so writer provided prn hydroxyzine.    2207 - PRN hydroxyzine 50 mg administered for anxiety     Assessment / Actions:    Appetite: fair.   Food/Fluid intake: adequate (see Flowsheet)      Medical:  • Vital Signs: WNL    Plan:   Treatment Plan reviewed; no revisions at this time.       I acted within this role throughout the entirety of the procedure performed by the primary surgeon

## 2024-05-06 NOTE — PROGRESS NOTE ADULT - SUBJECTIVE AND OBJECTIVE BOX
MICAELA CARPIO      63y   Female   MRN-[id number]         No Known Allergies             Daily Height in cm: 165.1 (06 May 2024 09:16)    Daily Drug Dosing Weight  Height (cm): 165.1 (06 May 2024 09:16)  Weight (kg): 103.4 (06 May 2024 09:16)  BMI (kg/m2): 37.9 (06 May 2024 09:16)  BSA (m2): 2.09 (06 May 2024 09:16)    62 y/o F with H/O: HTN, Asthma (pt denies prior hospitalizations or intubation), Hypothyroidism, Obesity presents to [facility] for pre op evaluation with C/O chest pain s/p Covid infections the end of Dec, 2023. Pt was admitted to the hosp. in [place]. Pre op diagnosis: other nonspecific abnormal finding of lung field. Now schedule for left Video Assisted Thoracoscopic surgery (VATS), robotic assisted, left upper lobe wedge resection with IR marking left upper lobe series 4, image 80, left lower lobe wedge resection, possible segmentectomy, mediastinal lymph node dissection tentatively on 05/06/24.  CHIEF COMPLAINT: Follow up in ICU  for postoperative care of patient who is s/p lung surgery    Procedure: FB, Robot-assisted LVATS LLL medial basilar segmentectomy, CLEO wedge resection, pleural biopsy, MLND 5/6/2024                       Issues:              Lung nodule              Postop pain              Chest tube in place  HTN (hypertension)  Hypothyroidism  Asthma  Obesity      Postop course:     Patient reports moderate pain at chest wall incision sites which is worse with coughing and deep breathing without associated fever or dyspnea. Pain is improved with use of PCA and  oral pain meds.         Home Medications:  amLODIPine 10 mg oral tablet: 1 tab(s) orally once a day (06 May 2024 09:30)  Breo Ellipta 200 mcg-25 mcg/inh inhalation powder: 1 puff(s) inhaled once a day (06 May 2024 09:30)  Synthroid 50 mcg (0.05 mg) oral tablet: 1 tab(s) orally once a day (06 May 2024 09:30)  Vitamin D 1 cap po daily:  (06 May 2024 09:30)    PAST MEDICAL & SURGICAL HISTORY:  HTN (hypertension)      Hypothyroidism      Asthma      Obesity      History of tubal ligation        Vital Signs Last 24 Hrs  T(C): 36.4 (06 May 2024 18:30), Max: 36.9 (06 May 2024 12:29)  T(F): 97.6 (06 May 2024 18:30), Max: 98.4 (06 May 2024 12:29)  HR: 88 (06 May 2024 19:00) (67 - 100)  BP: 145/80 (06 May 2024 19:00) (111/64 - 151/92)  BP(mean): 98 (06 May 2024 19:00) (98 - 111)  RR: 25 (06 May 2024 19:00) (10 - 26)  SpO2: 96% (06 May 2024 19:00) (96% - 99%)    Parameters below as of 06 May 2024 19:00  Patient On (Oxygen Delivery Method): nasal cannula w/ humidification  O2 Flow (L/min): 2    I&O's Detail    06 May 2024 07:01  -  06 May 2024 19:11  --------------------------------------------------------  IN:    IV PiggyBack: 100 mL    Lactated Ringers: 60 mL  Total IN: 160 mL    OUT:  Total OUT: 0 mL    Total NET: 160 mL        CAPILLARY BLOOD GLUCOSE        Home Medications:  amLODIPine 10 mg oral tablet: 1 tab(s) orally once a day (06 May 2024 09:30)  Breo Ellipta 200 mcg-25 mcg/inh inhalation powder: 1 puff(s) inhaled once a day (06 May 2024 09:30)  Synthroid 50 mcg (0.05 mg) oral tablet: 1 tab(s) orally once a day (06 May 2024 09:30)  Vitamin D 1 cap po daily:  (06 May 2024 09:30)    MEDICATIONS  (STANDING):  budesonide 160 MICROgram(s)/formoterol 4.5 MICROgram(s) Inhaler 2 Puff(s) Inhalation two times a day  heparin   Injectable 5000 Unit(s) SubCutaneous every 8 hours  HYDROmorphone PCA (1 mG/mL) 30 milliLiter(s) PCA Continuous PCA Continuous  lactated ringers. 1000 milliLiter(s) (30 mL/Hr) IV Continuous <Continuous>  levothyroxine 50 MICROGram(s) Oral daily  polyethylene glycol 3350 17 Gram(s) Oral daily  senna 2 Tablet(s) Oral at bedtime    MEDICATIONS  (PRN):  diphenhydrAMINE Injectable 25 milliGRAM(s) IV Push every 4 hours PRN Pruritus  HYDROmorphone PCA (1 mG/mL) Rescue Clinician Bolus 0.5 milliGRAM(s) IV Push every 15 minutes PRN for Pain Scale GREATER THAN 6  naloxone Injectable 0.1 milliGRAM(s) IV Push every 3 minutes PRN For ANY of the following changes in patient status:  A. RR LESS THAN 10 breaths per minute, B. Oxygen saturation LESS THAN 90%, C. Sedation score of 6  ondansetron Injectable 4 milliGRAM(s) IV Push every 6 hours PRN Nausea    Physical exam:                             General:               Pt is awake, alert,  appears to be in pain but not in distress                                                  Neuro:                  Nonfocal                             Psych:                   A&Ox3                          Cardiovascular:   S1 & S2, regular                           Respiratory:         Air entry is fair and equal on both sides, has bilateral conducted sounds                           GI:                          Soft, nondistended and nontender, Bowel sounds active                            Ext:                        No cyanosis or edema     Labs:                                                                 CXR:      Plan:  General: 63yFemale s/p  FB, Robot-assisted LVATS LLL medial basilar segmentectomy, CLEO wedge resection, pleural biopsy, MLND, experiencing  pain with deep breathing.                             Neuro:                                         Pain control with PCA /  Tylenol PRN / Lidopatch                            Cardiovascular:                                          Telemetry (medical test) - Reviewed by me today independently. Pt is in normal sinus rhythm.                                         HTN: Continue hemodynamic monitoring and restart Norvasc                                        Continue hemodynamic monitoring to prevent decompensation.                            Respiratory:                                         Postop hypoxemia requiring O2 via nasal cannula probably due to postop pain - Wean nasal cannula for goal O2sat above 92%.                                              Obtain CXR . Encourage incentive spirometry.                                                   Chest PT and frequent suctioning.                                          Asthma: Continue bronchodilators, inhaled steroids, Pulmozyme and inhaled 3% saline inhalations.                                         OOB to chair & ambulate w/ assistance.                                          Continuous pulse oximetry for support & to prevent decompensation.                                         Monitor chest tube output                                         Chest tube to suction                                                                                         GI                                         On puree diet, advance to  regular diet as tolerated                                         Continue G.I prophylaxis with Protonix                                          Continue Zofran / Reglan for nausea - PRN                                         Continue bowel regimen	                                                                 Renal:                                         Continue LR  30cc/hr                                         Monitor I/Os and electrolytes                                                                                        Hem/ Onc:                                         DVT prophylaxis with SQ Heparin and SCDs                                         Monitor chest tube output &  signs of bleeding.                                          Follow CBC in AM                           Infectious disease:                                            Monitor for fever / leukocytosis.                                          All surgical incision / chest tube  sites look clean                            Endocrine                                             Continue Accu-Checks with coverage                                           Hypothyroidism: Continue Synthroid       Pertinent clinical, laboratory, radiographic, hemodynamic, echocardiographic, respiratory data, microbiologic data and chart were reviewed and analyzed frequently throughout the course of the day and night. Patient seen, examined and plan discussed with CT Surgeon Dr. Fraser / CTICU team during rounds.  OOB to chair and ambulate with physical therapy as tolerated.   Status discussed with patient and updated plan of care.   I have spent 50 minutes with this patient  including 20 minutes of time monitoring hemodynamic status, respiratory status and  coordinating care in the ICU.          Jasvir Boles MD

## 2024-05-06 NOTE — PATIENT PROFILE ADULT - FALL HARM RISK - UNIVERSAL INTERVENTIONS
Bed in lowest position, wheels locked, appropriate side rails in place/Call bell, personal items and telephone in reach/Instruct patient to call for assistance before getting out of bed or chair/Non-slip footwear when patient is out of bed/[place] to call system/Physically safe environment - no spills, clutter or unnecessary equipment/Purposeful Proactive Rounding/Room/bathroom lighting operational, light cord in reach

## 2024-05-06 NOTE — BRIEF OPERATIVE NOTE - NSICDXBRIEFPROCEDURE_GEN_ALL_CORE_FT
PROCEDURES:  Lung segmentectomy 06-May-2024 17:56:24 FB, Robot-assisted LVATS LLL medial basilar segmentectomy, CLEO wedge resection, pleural biopsy, MLND Dustin Goldberg

## 2024-05-06 NOTE — PRE PROCEDURE NOTE - TEMPERATURE IN CELSIUS (DEGREES C)
Pharmacy Vancomycin Note  Date of Service 2020  Patient's  1965   55 year old, male    Indication: Febrile Neutropenia  Goal Trough Level: 10-15 mg/L  Day of Therapy: 3  Current Vancomycin regimen:  1000 mg IV q24h    Current estimated CrCl = Estimated Creatinine Clearance: 39 mL/min (A) (based on SCr of 1.41 mg/dL (H)).    Creatinine for last 3 days  2020:  4:01 PM Creatinine 1.37 mg/dL  2020:  7:15 AM Creatinine 1.30 mg/dL  2020:  6:28 AM Creatinine 1.41 mg/dL    Recent Vancomycin Levels (past 3 days)  2020:  5:37 PM Vancomycin Level 11.0 mg/L    Vancomycin IV Administrations (past 72 hours)                   vancomycin (VANCOCIN) 1000 mg in dextrose 5% 200 mL PREMIX (mg) 1,000 mg New Bag 20 1854     1,000 mg New Bag 20 1815    vancomycin (VANCOCIN) 1000 mg in dextrose 5% 200 mL PREMIX (mg) 1,000 mg New Bag 20 1808                Nephrotoxins and other renal medications (From now, onward)    Start     Dose/Rate Route Frequency Ordered Stop    20 2030  furosemide (LASIX) injection 10 mg      10 mg  over 1-3 Minutes Intravenous ONCE 20 2000      20 1400  acyclovir (ZOVIRAX) 250 mg in D5W 50 mL intermittent infusion      5 mg/kg × 45.5 kg  50 mL/hr over 1 Hours Intravenous EVERY 12 HOURS 20 1257      20 1700  vancomycin (VANCOCIN) 1000 mg in dextrose 5% 200 mL PREMIX      1,000 mg  200 mL/hr over 1 Hours Intravenous EVERY 24 HOURS 20 2109               Contrast Orders - past 72 hours (72h ago, onward)    None          Interpretation of levels and current regimen:  Trough level is  Therapeutic    Has serum creatinine changed > 50% in last 72 hours: No    Renal Function: Stable , SrCr increased to 1.41 today.  Plan:  1.  Continue Current Dose  2.  Pharmacy will check trough levels as appropriate in 1-3 Days.    3. Serum creatinine levels will be checked as ordered.     Isak Lynn RPH        .       36.7

## 2024-05-07 LAB
ANION GAP SERPL CALC-SCNC: 13 MMOL/L — SIGNIFICANT CHANGE UP (ref 7–14)
BASOPHILS # BLD AUTO: 0.01 K/UL — SIGNIFICANT CHANGE UP (ref 0–0.2)
BASOPHILS NFR BLD AUTO: 0.1 % — SIGNIFICANT CHANGE UP (ref 0–2)
BUN SERPL-MCNC: 18 MG/DL — SIGNIFICANT CHANGE UP (ref 7–23)
CALCIUM SERPL-MCNC: 8.3 MG/DL — LOW (ref 8.4–10.5)
CHLORIDE SERPL-SCNC: 102 MMOL/L — SIGNIFICANT CHANGE UP (ref 98–107)
CO2 SERPL-SCNC: 21 MMOL/L — LOW (ref 22–31)
CREAT SERPL-MCNC: 0.68 MG/DL — SIGNIFICANT CHANGE UP (ref 0.5–1.3)
EGFR: 99 ML/MIN/1.73M2 — SIGNIFICANT CHANGE UP
EOSINOPHIL # BLD AUTO: 0 K/UL — SIGNIFICANT CHANGE UP (ref 0–0.5)
EOSINOPHIL NFR BLD AUTO: 0 % — SIGNIFICANT CHANGE UP (ref 0–6)
GLUCOSE SERPL-MCNC: 121 MG/DL — HIGH (ref 70–99)
HCT VFR BLD CALC: 40.9 % — SIGNIFICANT CHANGE UP (ref 34.5–45)
HGB BLD-MCNC: 13 G/DL — SIGNIFICANT CHANGE UP (ref 11.5–15.5)
IANC: 11.37 K/UL — HIGH (ref 1.8–7.4)
IMM GRANULOCYTES NFR BLD AUTO: 0.4 % — SIGNIFICANT CHANGE UP (ref 0–0.9)
LYMPHOCYTES # BLD AUTO: 0.36 K/UL — LOW (ref 1–3.3)
LYMPHOCYTES # BLD AUTO: 2.9 % — LOW (ref 13–44)
MAGNESIUM SERPL-MCNC: 1.9 MG/DL — SIGNIFICANT CHANGE UP (ref 1.6–2.6)
MCHC RBC-ENTMCNC: 27.3 PG — SIGNIFICANT CHANGE UP (ref 27–34)
MCHC RBC-ENTMCNC: 31.8 GM/DL — LOW (ref 32–36)
MCV RBC AUTO: 85.9 FL — SIGNIFICANT CHANGE UP (ref 80–100)
MONOCYTES # BLD AUTO: 0.58 K/UL — SIGNIFICANT CHANGE UP (ref 0–0.9)
MONOCYTES NFR BLD AUTO: 4.7 % — SIGNIFICANT CHANGE UP (ref 2–14)
MRSA PCR RESULT.: SIGNIFICANT CHANGE UP
NEUTROPHILS # BLD AUTO: 11.37 K/UL — HIGH (ref 1.8–7.4)
NEUTROPHILS NFR BLD AUTO: 91.9 % — HIGH (ref 43–77)
NRBC # BLD: 0 /100 WBCS — SIGNIFICANT CHANGE UP (ref 0–0)
NRBC # FLD: 0 K/UL — SIGNIFICANT CHANGE UP (ref 0–0)
PHOSPHATE SERPL-MCNC: 4.5 MG/DL — SIGNIFICANT CHANGE UP (ref 2.5–4.5)
PLATELET # BLD AUTO: 220 K/UL — SIGNIFICANT CHANGE UP (ref 150–400)
POTASSIUM SERPL-MCNC: 4.5 MMOL/L — SIGNIFICANT CHANGE UP (ref 3.5–5.3)
POTASSIUM SERPL-SCNC: 4.5 MMOL/L — SIGNIFICANT CHANGE UP (ref 3.5–5.3)
RBC # BLD: 4.76 M/UL — SIGNIFICANT CHANGE UP (ref 3.8–5.2)
RBC # FLD: 12.9 % — SIGNIFICANT CHANGE UP (ref 10.3–14.5)
S AUREUS DNA NOSE QL NAA+PROBE: SIGNIFICANT CHANGE UP
SODIUM SERPL-SCNC: 136 MMOL/L — SIGNIFICANT CHANGE UP (ref 135–145)
WBC # BLD: 12.37 K/UL — HIGH (ref 3.8–10.5)
WBC # FLD AUTO: 12.37 K/UL — HIGH (ref 3.8–10.5)

## 2024-05-07 PROCEDURE — 71045 X-RAY EXAM CHEST 1 VIEW: CPT | Mod: 26

## 2024-05-07 PROCEDURE — 71045 X-RAY EXAM CHEST 1 VIEW: CPT | Mod: 26,77

## 2024-05-07 PROCEDURE — 99233 SBSQ HOSP IP/OBS HIGH 50: CPT

## 2024-05-07 RX ORDER — OXYCODONE HYDROCHLORIDE 5 MG/1
5 TABLET ORAL EVERY 4 HOURS
Refills: 0 | Status: DISCONTINUED | OUTPATIENT
Start: 2024-05-07 | End: 2024-05-08

## 2024-05-07 RX ORDER — DORNASE ALFA 1 MG/ML
2.5 SOLUTION RESPIRATORY (INHALATION) DAILY
Refills: 0 | Status: DISCONTINUED | OUTPATIENT
Start: 2024-05-07 | End: 2024-05-08

## 2024-05-07 RX ORDER — LIDOCAINE 4 G/100G
1 CREAM TOPICAL DAILY
Refills: 0 | Status: DISCONTINUED | OUTPATIENT
Start: 2024-05-07 | End: 2024-05-08

## 2024-05-07 RX ORDER — MAGNESIUM SULFATE 500 MG/ML
1 VIAL (ML) INJECTION ONCE
Refills: 0 | Status: COMPLETED | OUTPATIENT
Start: 2024-05-07 | End: 2024-05-07

## 2024-05-07 RX ORDER — FUROSEMIDE 40 MG
10 TABLET ORAL ONCE
Refills: 0 | Status: DISCONTINUED | OUTPATIENT
Start: 2024-05-07 | End: 2024-05-07

## 2024-05-07 RX ORDER — ALBUTEROL 90 UG/1
2.5 AEROSOL, METERED ORAL EVERY 6 HOURS
Refills: 0 | Status: DISCONTINUED | OUTPATIENT
Start: 2024-05-07 | End: 2024-05-08

## 2024-05-07 RX ORDER — SODIUM CHLORIDE 9 MG/ML
4 INJECTION INTRAMUSCULAR; INTRAVENOUS; SUBCUTANEOUS EVERY 6 HOURS
Refills: 0 | Status: DISCONTINUED | OUTPATIENT
Start: 2024-05-07 | End: 2024-05-08

## 2024-05-07 RX ORDER — CALCIUM CARBONATE 500(1250)
1 TABLET ORAL EVERY 4 HOURS
Refills: 0 | Status: DISCONTINUED | OUTPATIENT
Start: 2024-05-07 | End: 2024-05-08

## 2024-05-07 RX ORDER — OXYCODONE HYDROCHLORIDE 5 MG/1
10 TABLET ORAL EVERY 4 HOURS
Refills: 0 | Status: DISCONTINUED | OUTPATIENT
Start: 2024-05-07 | End: 2024-05-08

## 2024-05-07 RX ORDER — FUROSEMIDE 40 MG
10 TABLET ORAL ONCE
Refills: 0 | Status: COMPLETED | OUTPATIENT
Start: 2024-05-07 | End: 2024-05-07

## 2024-05-07 RX ADMIN — HYDROMORPHONE HYDROCHLORIDE 30 MILLILITER(S): 2 INJECTION INTRAMUSCULAR; INTRAVENOUS; SUBCUTANEOUS at 07:10

## 2024-05-07 RX ADMIN — BUDESONIDE AND FORMOTEROL FUMARATE DIHYDRATE 2 PUFF(S): 160; 4.5 AEROSOL RESPIRATORY (INHALATION) at 21:59

## 2024-05-07 RX ADMIN — Medication 1000 MILLIGRAM(S): at 08:50

## 2024-05-07 RX ADMIN — Medication 400 MILLIGRAM(S): at 02:27

## 2024-05-07 RX ADMIN — Medication 100 GRAM(S): at 05:11

## 2024-05-07 RX ADMIN — OXYCODONE HYDROCHLORIDE 10 MILLIGRAM(S): 5 TABLET ORAL at 16:10

## 2024-05-07 RX ADMIN — BUDESONIDE AND FORMOTEROL FUMARATE DIHYDRATE 2 PUFF(S): 160; 4.5 AEROSOL RESPIRATORY (INHALATION) at 07:08

## 2024-05-07 RX ADMIN — ALBUTEROL 2.5 MILLIGRAM(S): 90 AEROSOL, METERED ORAL at 21:59

## 2024-05-07 RX ADMIN — OXYCODONE HYDROCHLORIDE 10 MILLIGRAM(S): 5 TABLET ORAL at 15:40

## 2024-05-07 RX ADMIN — ALBUTEROL 2.5 MILLIGRAM(S): 90 AEROSOL, METERED ORAL at 14:55

## 2024-05-07 RX ADMIN — Medication 1 TABLET(S): at 12:33

## 2024-05-07 RX ADMIN — SENNA PLUS 2 TABLET(S): 8.6 TABLET ORAL at 21:00

## 2024-05-07 RX ADMIN — HEPARIN SODIUM 5000 UNIT(S): 5000 INJECTION INTRAVENOUS; SUBCUTANEOUS at 05:16

## 2024-05-07 RX ADMIN — SODIUM CHLORIDE 30 MILLILITER(S): 9 INJECTION, SOLUTION INTRAVENOUS at 07:11

## 2024-05-07 RX ADMIN — Medication 1000 MILLIGRAM(S): at 21:45

## 2024-05-07 RX ADMIN — POLYETHYLENE GLYCOL 3350 17 GRAM(S): 17 POWDER, FOR SOLUTION ORAL at 11:11

## 2024-05-07 RX ADMIN — Medication 400 MILLIGRAM(S): at 08:26

## 2024-05-07 RX ADMIN — HEPARIN SODIUM 5000 UNIT(S): 5000 INJECTION INTRAVENOUS; SUBCUTANEOUS at 21:00

## 2024-05-07 RX ADMIN — OXYCODONE HYDROCHLORIDE 5 MILLIGRAM(S): 5 TABLET ORAL at 23:38

## 2024-05-07 RX ADMIN — Medication 1000 MILLIGRAM(S): at 14:10

## 2024-05-07 RX ADMIN — OXYCODONE HYDROCHLORIDE 5 MILLIGRAM(S): 5 TABLET ORAL at 10:36

## 2024-05-07 RX ADMIN — OXYCODONE HYDROCHLORIDE 5 MILLIGRAM(S): 5 TABLET ORAL at 11:10

## 2024-05-07 RX ADMIN — HEPARIN SODIUM 5000 UNIT(S): 5000 INJECTION INTRAVENOUS; SUBCUTANEOUS at 13:48

## 2024-05-07 RX ADMIN — Medication 10 MILLIGRAM(S): at 19:23

## 2024-05-07 RX ADMIN — LIDOCAINE 1 PATCH: 4 CREAM TOPICAL at 19:00

## 2024-05-07 RX ADMIN — Medication 1000 MILLIGRAM(S): at 02:45

## 2024-05-07 RX ADMIN — LIDOCAINE 1 PATCH: 4 CREAM TOPICAL at 23:00

## 2024-05-07 RX ADMIN — Medication 50 MICROGRAM(S): at 05:16

## 2024-05-07 RX ADMIN — Medication 400 MILLIGRAM(S): at 20:23

## 2024-05-07 RX ADMIN — Medication 400 MILLIGRAM(S): at 13:48

## 2024-05-07 RX ADMIN — LIDOCAINE 1 PATCH: 4 CREAM TOPICAL at 11:09

## 2024-05-07 RX ADMIN — SODIUM CHLORIDE 30 MILLILITER(S): 9 INJECTION, SOLUTION INTRAVENOUS at 05:13

## 2024-05-07 NOTE — DISCHARGE NOTE PROVIDER - CARE PROVIDER_API CALL
Renetta Fraser  Thoracic Surgery  [id number] [place] [address], [address] [place] [address]  Phone: [phone]  Fax: [phone]  Follow Up Time:

## 2024-05-07 NOTE — PROGRESS NOTE ADULT - SUBJECTIVE AND OBJECTIVE BOX
Anesthesia Pain Management Service    SUBJECTIVE: Patient is doing well with IV PCA and no significant problems reported. Has ambulated and tolerating diet.    Pain Scale Score	At rest: ___ 	With Activity: ___ 	[X ] Refer to charted pain scores    THERAPY:    [ ] IV PCA Morphine		[ ] 5 mg/mL	[ ] 1 mg/mL  [X ] IV PCA Hydromorphone	[ ] 5 mg/mL	[X ] 1 mg/mL  [ ] IV PCA Fentanyl		[ ] 50 micrograms/mL    Demand dose __0.2_ lockout __6_ (minutes) Continuous Rate _0__ Total: _4.6__   mg used (in past 24 hrs)      MEDICATIONS  (STANDING):  acetaminophen   IVPB .. 1000 milliGRAM(s) IV Intermittent every 6 hours  budesonide 160 MICROgram(s)/formoterol 4.5 MICROgram(s) Inhaler 2 Puff(s) Inhalation two times a day  heparin   Injectable 5000 Unit(s) SubCutaneous every 8 hours  lactated ringers. 1000 milliLiter(s) (30 mL/Hr) IV Continuous <Continuous>  levothyroxine 50 MICROGram(s) Oral daily  lidocaine   4% Patch 1 Patch Transdermal daily  polyethylene glycol 3350 17 Gram(s) Oral daily  senna 2 Tablet(s) Oral at bedtime    MEDICATIONS  (PRN):  calcium carbonate    500 mG (Tums) Chewable 1 Tablet(s) Chew every 4 hours PRN Upset Stomach  diphenhydrAMINE Injectable 25 milliGRAM(s) IV Push every 4 hours PRN Pruritus  naloxone Injectable 0.1 milliGRAM(s) IV Push every 3 minutes PRN For ANY of the following changes in patient status:  A. RR LESS THAN 10 breaths per minute, B. Oxygen saturation LESS THAN 90%, C. Sedation score of 6  ondansetron Injectable 4 milliGRAM(s) IV Push every 6 hours PRN Nausea  oxyCODONE    IR 10 milliGRAM(s) Oral every 4 hours PRN Severe Pain (7 - 10)  oxyCODONE    IR 5 milliGRAM(s) Oral every 4 hours PRN Moderate Pain (4 - 6)      OBJECTIVE: Patient sitting in chair, CTx1    Sedation Score:	[ X] Alert	[ ] Drowsy 	[ ] Arousable	[ ] Asleep	[ ] Unresponsive    Side Effects:	[X ] None	[ ] Nausea	[ ] Vomiting	[ ] Pruritus  		[ ] Other:    Vital Signs Last 24 Hrs  T(C): 36.7 (07 May 2024 08:00), Max: 37.3 (07 May 2024 04:00)  T(F): 98 (07 May 2024 08:00), Max: 99.1 (07 May 2024 04:00)  HR: 77 (07 May 2024 09:00) (67 - 100)  BP: 110/62 (07 May 2024 09:00) (105/65 - 151/92)  BP(mean): 76 (07 May 2024 09:00) (76 - 111)  RR: 14 (07 May 2024 09:00) (10 - 26)  SpO2: 91% (07 May 2024 09:00) (91% - 99%)    Parameters below as of 07 May 2024 09:00  Patient On (Oxygen Delivery Method): room air        ASSESSMENT/ PLAN    Therapy to  be:	[ ] Continue   [ X] Discontinued   [X ] Change to prn Analgesics    Documentation and Verification of current medications:   [X] Done	[ ] Not done, not elligible    Comments: Discussed with CTICU, PCA discontinued. PRN Oral/IV opioids and/or Adjuvant non-opioid medication to be ordered at this point.    Progress Note written now but Patient was seen earlier.

## 2024-05-07 NOTE — PROGRESS NOTE ADULT - SUBJECTIVE AND OBJECTIVE BOX
CHIEF COMPLAINT: FOLLOW UP IN ICU FOR POSTOPERATIVE CARE OF PATIENT WHO IS S/P LUNG RESECTION      PROCEDURES: Robot-assisted LVATS LLL medial basilar segmentectomy, CLEO wedge resection, pleural biopsy, MLND 06-May-2024       ISSUES:   L pneumothorax  Lung nodule  Post op pain  Chest tube in place  Asthma  HTN  Obesity (BMI 37)  Hypothyroidism    INTERVAL EVENTS:   Chest tube with no airleak.      HISTORY:   Patient reports moderate pain at chest wall incision sites which is worse with coughing and deep breathing without associated fever or dyspnea. Pain is improved with use of pain meds.     PHYSICAL EXAM:   Gen: Comfortable, No acute distress  Eyes: Sclera white, Conjunctiva normal, Eyelids normal, Pupils symmetrical   ENT: Mucous membranes moist,  ,  ,    Neck: Trachea midline,  ,  ,  ,  ,  ,    CV: Rate regular, Rhythm regular,  ,  ,    Resp: Breath sounds clear, No accessory muscles use, L chest tube in place,  ,    Abd: Soft, Non-distended, Non-tender,   ,  ,  ,    Skin: Warm, No peripheral edema of lower extremities,  ,    : No matias  Neuro: Moving all 4 extremities,    Psych: A&Ox3      ASSESSMENT AND PLAN:     NEURO:  Post-operative Pain - Stable. Pain control with oxycodone PO         RESPIRATORY:  Hypoxia - Wean nasal cannula for goal O2sat above 92. Obtain CXR. Incentive spirometry. Chest PT and frequent suctioning. Continue bronchodilators. OOB to chair & ambulate w/ assistance. Continuous pulse oximetry for support & to prevent decompensation.     L pneumothorax - stable.  - Chest tube – Pleurevac water seal. Monitor chest tube output. Repeat CXR today.  - Monitor for increased pneumothorax size     Asthma - worsened by thoracic surgery. Continue bronchodilators.             CARDIOVASCULAR:  Hemodynamically stable - Not on pressors. Continue hemodynamic monitoring.  Telemetry (medical test) - Reviewed by me today independently. Normal sinus rhythm.  HTN - stable. Hold home antihypertensives for now.            RENAL:  Stable - Monitor IOs and electrolytes. Keep K above 4.0 and Mg above 2.0.          GASTROINTESTINAL:  GI prophylaxis not indicated  Zofran and Reglan IV PRN for nausea  Regular consistency diet       HEMATOLOGIC:  No signs of active bleeding. Monitor Hgb in CBC in AM  DVT prophylaxis with heparin subQ          INFECTIOUS DISEASE:  No signs of active infection. Will monitor for fever and leukocytosis.          ENDOCRINE:  Stable – Monitor glucose fingersticks for goal 120-180.  Hypothyroidism - stable. Continue Synthroid.            ONCOLOGY:  Lung nodule - Improved. S/P resection. Follow up final pathology.           Pertinent clinical, laboratory, radiographic, hemodynamic, echocardiographic, respiratory data, microbiologic data and chart were reviewed by myself and analyzed frequently throughout the course of the day and night by myself.    Plan discussed at length with the CTICU staff and Attending CT Surgeon -   Dr Renetta Fraser.       Patient's status was discussed with patient at bedside.       	  I have spent 50 minutes of time with this patient monitoring hemodynamic status, respiratory status, and coordinating care in the ICU.    ________________________________________________      _________________________  VITAL SIGNS:  Vital Signs Last 24 Hrs  T(C): 36.7 (07 May 2024 08:00), Max: 37.3 (07 May 2024 04:00)  T(F): 98 (07 May 2024 08:00), Max: 99.1 (07 May 2024 04:00)  HR: 73 (07 May 2024 10:00) (67 - 100)  BP: 111/69 (07 May 2024 10:00) (105/65 - 151/92)  BP(mean): 80 (07 May 2024 10:00) (76 - 111)  RR: 14 (07 May 2024 10:00) (10 - 26)  SpO2: 94% (07 May 2024 10:00) (91% - 99%)    Parameters below as of 07 May 2024 10:00  Patient On (Oxygen Delivery Method): room air      I/Os:   I&O's Detail    06 May 2024 07:01  -  07 May 2024 07:00  --------------------------------------------------------  IN:    IV PiggyBack: 300 mL    Lactated Ringers: 420 mL  Total IN: 720 mL    OUT:    Chest Tube (mL): 55 mL    Voided (mL): 850 mL  Total OUT: 905 mL    Total NET: -185 mL      07 May 2024 07:01  -  07 May 2024 10:57  --------------------------------------------------------  IN:    IV PiggyBack: 100 mL    Lactated Ringers: 90 mL    Oral Fluid: 240 mL  Total IN: 430 mL    OUT:    Chest Tube (mL): 10 mL    Voided (mL): 250 mL  Total OUT: 260 mL    Total NET: 170 mL              MEDICATIONS:  MEDICATIONS  (STANDING):  acetaminophen   IVPB .. 1000 milliGRAM(s) IV Intermittent every 6 hours  albuterol    0.083% 2.5 milliGRAM(s) Nebulizer every 6 hours  budesonide 160 MICROgram(s)/formoterol 4.5 MICROgram(s) Inhaler 2 Puff(s) Inhalation two times a day  dornase l[place] Solution 2.5 milliGRAM(s) Inhalation daily  heparin   Injectable 5000 Unit(s) SubCutaneous every 8 hours  lactated ringers. 1000 milliLiter(s) (30 mL/Hr) IV Continuous <Continuous>  levothyroxine 50 MICROGram(s) Oral daily  lidocaine   4% Patch 1 Patch Transdermal daily  polyethylene glycol 3350 17 Gram(s) Oral daily  senna 2 Tablet(s) Oral at bedtime  sodium chloride 3%  Inhalation 4 milliLiter(s) Inhalation every 6 hours    MEDICATIONS  (PRN):  calcium carbonate    500 mG (Tums) Chewable 1 Tablet(s) Chew every 4 hours PRN Upset Stomach  diphenhydrAMINE Injectable 25 milliGRAM(s) IV Push every 4 hours PRN Pruritus  naloxone Injectable 0.1 milliGRAM(s) IV Push every 3 minutes PRN For ANY of the following changes in patient status:  A. RR LESS THAN 10 breaths per minute, B. Oxygen saturation LESS THAN 90%, C. Sedation score of 6  ondansetron Injectable 4 milliGRAM(s) IV Push every 6 hours PRN Nausea  oxyCODONE    IR 5 milliGRAM(s) Oral every 4 hours PRN Moderate Pain (4 - 6)  oxyCODONE    IR 10 milliGRAM(s) Oral every 4 hours PRN Severe Pain (7 - 10)      LABS:  Laboratory data was independently reviewed by me today.                           13.0   12.37 )-----------( 220      ( 07 May 2024 03:12 )             40.9     05-07    136  |  102  |  18  ----------------------------<  121<H>  4.5   |  21<L>  |  0.68    Ca    8.3<L>      07 May 2024 03:12  Phos  4.5     05-07  Mg     1.90     05-07            Urinalysis Basic - ( 07 May 2024 03:12 )    Color: x / Appearance: x / SG: x / pH: x  Gluc: 121 mg/dL / Ketone: x  / Bili: x / Urobili: x   Blood: x / Protein: x / Nitrite: x   Leuk Esterase: x / RBC: x / WBC x   Sq Epi: x / Non Sq Epi: x / Bacteria: x        RADIOLOGY:   Radiology images were independently reviewed by me today. Reports were reviewed by me today.    Xray Chest 1 View AP/PA:   ACC: [id number] EXAM:  XR CHEST AP OR PA 1V   ORDERED BY: JADA NOBLES     ACC: [id number] EXAM:  XR CHEST PORTABLE URGENT 1V   ORDERED BY: JADA NOBLES     PROCEDURE DATE:  05/06/2024          INTERPRETATION:  CLINICAL INFORMATION: Post left thoracotomy.    TIME OF EXAMINATION: May 6, 2024 at 7:15 PM and May 7 at 5:45 AM.    EXAM: Portable chest    FINDINGS:    May 6 at 7:15 PM:  Left-sided chest tube is present.  Lungs show no focal consolidations.  Linear atelectasis right midlung field.  No effusions or pneumothorax.    May 7 at 5:45 AM:  Tiny left apical pneumothorax is seen with the chest tube in place.  Lungs show no focal consolidations.      COMPARISON: December 15, 2023        IMPRESSION: Status post left thoracotomy with chest tube and tiny   pneumothorax.    --- End of Report ---            JOEL CHAN MD; Attending Radiologist  This document has been electronically signed. May  7 2024 10:06AM (05-07-24 @ 06:13)  Xray Chest 1 View- PORTABLE-Urgent:   ACC: [id number] EXAM:  XR CHEST AP OR PA 1V   ORDERED BY: JADA NOBLES     ACC: [id number] EXAM:  XR CHEST PORTABLE URGENT 1V   ORDERED BY: JADA   LAMBERT     PROCEDURE DATE:  05/06/2024          INTERPRETATION:  CLINICAL INFORMATION: Post left thoracotomy.    TIME OF EXAMINATION: May 6, 2024 at 7:15 PM and May 7 at 5:45 AM.    EXAM: Portable chest    FINDINGS:    May 6 at 7:15 PM:  Left-sided chest tube is present.  Lungs show no focal consolidations.  Linear atelectasis right midlung field.  No effusions or pneumothorax.    May 7 at 5:45 AM:  Tiny left apical pneumothorax is seen with the chest tube in place.  Lungs show no focal consolidations.      COMPARISON: December 15, 2023        IMPRESSION: Status post left thoracotomy with chest tube and tiny   pneumothorax.    --- End of Report ---            JOEL CHAN MD; Attending Radiologist  This document has been electronically signed. May  7 2024 10:06AM (05-06-24 @ 19:52)
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
## 2024-05-07 NOTE — ANESTHESIA FOLLOW-UP NOTE - NSRECOMMEND_GEN_ALL_CORE
Called and spoke to patient.    Informed patient Dr. Gonzalez has agreed to doing both procedures at his upcoming appointment.    Patient voiced understanding.    Maeve RN-BSN-PHN  [facility]  [phone]   None

## 2024-05-07 NOTE — DISCHARGE NOTE PROVIDER - NSDCCPCAREPLAN_GEN_ALL_CORE_FT
PRINCIPAL DISCHARGE DIAGNOSIS  Diagnosis: Other nonspecific abnormal finding of lung field  Assessment and Plan of Treatment:

## 2024-05-07 NOTE — DISCHARGE NOTE PROVIDER - NSDCMRMEDTOKEN_GEN_ALL_CORE_FT
amLODIPine 10 mg oral tablet: 1 tab(s) orally once a day  Breo Ellipta 200 mcg-25 mcg/inh inhalation powder: 1 puff(s) inhaled once a day  Synthroid 50 mcg (0.05 mg) oral tablet: 1 tab(s) orally once a day  Vitamin D 1 cap po daily:    amLODIPine 10 mg oral tablet: 1 tab(s) orally once a day  Breo Ellipta 200 mcg-25 mcg/inh inhalation powder: 1 puff(s) inhaled once a day  CXR: CXR PA and Lateral Dx: Lung nodule Please fax results to [phone]. MDD: 1  oxyCODONE 5 mg oral tablet: 1 tab(s) orally every 4 hours (5 times/day) as needed for Moderate Pain (4 - 6) MDD: 5  Synthroid 50 mcg (0.05 mg) oral tablet: 1 tab(s) orally once a day  Vitamin D 1 cap po daily:

## 2024-05-07 NOTE — DISCHARGE NOTE PROVIDER - NSDCCPTREATMENT_GEN_ALL_CORE_FT
PRINCIPAL PROCEDURE  Procedure: Lung segmentectomy  Findings and Treatment: FB, Robot-assisted LVATS LLL medial basilar segmentectomy, CLEO wedge resection, pleural biopsy, MLND

## 2024-05-07 NOTE — DISCHARGE NOTE PROVIDER - NSDCFUADDINST_GEN_ALL_CORE_FT
Follow up with Dr. Fraser in 10-14 days. Call Thoracic Surgery office [phone] to schedule an appointment. Please, obtain a CXR 1-2 days prior to your appointment and bring a copy with you.  Please, make an appointment with your Primary care provider.  You may shower in 24 hours with dressing and remove in the shower.  Keep the wound clean and dry it well after showering.  It’s OK if some fluid comes out of the chest tube hole unless blood or purulent. Blue Stitch will be removed by Dr. Fraser in the office. No driving while taking pain meds. Some coughing and discomfort is expected.  You can remove chest tube dressing for  shower and replace with band-aid after showering if you think it is necessary otherwise leave it open it air.    Your chest tube sites may ooze a little, simply keep the site clean with soap and water and place small dressing or band-aid.  Call your doctor if the drainage is purulent or pus like or if drainage is increasing.    Do not be alarmed with shoulder, neck or back pain.  This could be due to your positioning on the operating table and is muscular pain. Call your doctor for signs of wound infection such as wide area of redness beyond the edges of your incision, pus coming from incisional or drainage tube site, unusual or new swelling and fever greater than 101 degrees.  Go to ER for prolonged shortness of breath that gets worse or sudden onset or severe shortness of breath that does not get better with rest.

## 2024-05-07 NOTE — DISCHARGE NOTE PROVIDER - HOSPITAL COURSE
63F with PMHx of Asthma, HTN, hypothyroidism, obesity, lung mass, tubal ligation. On 5/6/24 she is s/p FB, Robot-assisted LVATS LLL medial basilar segmentectomy, CLEO wedge resection, pleural biopsy, MLND. Chest tube was removed when no air leak was seen and drainage was minimal. Post pull x-ray reviewed. Patient is stable for discharge home with continued out patient follow up. 63F with PMHx of Asthma, HTN, hypothyroidism, obesity, lung mass, tubal ligation. On 5/6/24 she is s/p FB, Robot-assisted LVATS LLL medial basilar segmentectomy, CLEO wedge resection, pleural biopsy, MLND. Chest tube was removed on 5/7/24 when no air leak was seen and drainage was minimal. Post pull x-ray was stable. Patient is stable for discharge home on 5/8/24 with continued out patient follow up.

## 2024-05-08 ENCOUNTER — TRANSCRIPTION ENCOUNTER (OUTPATIENT)
Age: 64
End: 2024-05-08

## 2024-05-08 VITALS — WEIGHT: 227.96 LBS

## 2024-05-08 LAB
ANION GAP SERPL CALC-SCNC: 12 MMOL/L — SIGNIFICANT CHANGE UP (ref 7–14)
BUN SERPL-MCNC: 17 MG/DL — SIGNIFICANT CHANGE UP (ref 7–23)
CALCIUM SERPL-MCNC: 8.7 MG/DL — SIGNIFICANT CHANGE UP (ref 8.4–10.5)
CHLORIDE SERPL-SCNC: 100 MMOL/L — SIGNIFICANT CHANGE UP (ref 98–107)
CO2 SERPL-SCNC: 23 MMOL/L — SIGNIFICANT CHANGE UP (ref 22–31)
CREAT SERPL-MCNC: 0.64 MG/DL — SIGNIFICANT CHANGE UP (ref 0.5–1.3)
EGFR: 100 ML/MIN/1.73M2 — SIGNIFICANT CHANGE UP
GLUCOSE SERPL-MCNC: 117 MG/DL — HIGH (ref 70–99)
HCT VFR BLD CALC: 41.6 % — SIGNIFICANT CHANGE UP (ref 34.5–45)
HGB BLD-MCNC: 13.4 G/DL — SIGNIFICANT CHANGE UP (ref 11.5–15.5)
MAGNESIUM SERPL-MCNC: 2.1 MG/DL — SIGNIFICANT CHANGE UP (ref 1.6–2.6)
MCHC RBC-ENTMCNC: 27.8 PG — SIGNIFICANT CHANGE UP (ref 27–34)
MCHC RBC-ENTMCNC: 32.2 GM/DL — SIGNIFICANT CHANGE UP (ref 32–36)
MCV RBC AUTO: 86.3 FL — SIGNIFICANT CHANGE UP (ref 80–100)
NRBC # BLD: 0 /100 WBCS — SIGNIFICANT CHANGE UP (ref 0–0)
NRBC # FLD: 0 K/UL — SIGNIFICANT CHANGE UP (ref 0–0)
PHOSPHATE SERPL-MCNC: 2.6 MG/DL — SIGNIFICANT CHANGE UP (ref 2.5–4.5)
PLATELET # BLD AUTO: 242 K/UL — SIGNIFICANT CHANGE UP (ref 150–400)
POTASSIUM SERPL-MCNC: 4.8 MMOL/L — SIGNIFICANT CHANGE UP (ref 3.5–5.3)
POTASSIUM SERPL-SCNC: 4.8 MMOL/L — SIGNIFICANT CHANGE UP (ref 3.5–5.3)
RBC # BLD: 4.82 M/UL — SIGNIFICANT CHANGE UP (ref 3.8–5.2)
RBC # FLD: 13 % — SIGNIFICANT CHANGE UP (ref 10.3–14.5)
SODIUM SERPL-SCNC: 135 MMOL/L — SIGNIFICANT CHANGE UP (ref 135–145)
WBC # BLD: 8.35 K/UL — SIGNIFICANT CHANGE UP (ref 3.8–10.5)
WBC # FLD AUTO: 8.35 K/UL — SIGNIFICANT CHANGE UP (ref 3.8–10.5)

## 2024-05-08 PROCEDURE — 99232 SBSQ HOSP IP/OBS MODERATE 35: CPT

## 2024-05-08 PROCEDURE — 71045 X-RAY EXAM CHEST 1 VIEW: CPT | Mod: 26

## 2024-05-08 RX ORDER — OXYCODONE HYDROCHLORIDE 5 MG/1
1 TABLET ORAL
Qty: 25 | Refills: 0
Start: 2024-05-08 | End: 2024-05-12

## 2024-05-08 RX ADMIN — OXYCODONE HYDROCHLORIDE 10 MILLIGRAM(S): 5 TABLET ORAL at 04:00

## 2024-05-08 RX ADMIN — DORNASE ALFA 2.5 MILLIGRAM(S): 1 SOLUTION RESPIRATORY (INHALATION) at 09:21

## 2024-05-08 RX ADMIN — OXYCODONE HYDROCHLORIDE 10 MILLIGRAM(S): 5 TABLET ORAL at 03:11

## 2024-05-08 RX ADMIN — HEPARIN SODIUM 5000 UNIT(S): 5000 INJECTION INTRAVENOUS; SUBCUTANEOUS at 05:50

## 2024-05-08 RX ADMIN — SODIUM CHLORIDE 4 MILLILITER(S): 9 INJECTION INTRAMUSCULAR; INTRAVENOUS; SUBCUTANEOUS at 09:22

## 2024-05-08 RX ADMIN — Medication 50 MICROGRAM(S): at 05:51

## 2024-05-08 RX ADMIN — OXYCODONE HYDROCHLORIDE 10 MILLIGRAM(S): 5 TABLET ORAL at 07:47

## 2024-05-08 RX ADMIN — BUDESONIDE AND FORMOTEROL FUMARATE DIHYDRATE 2 PUFF(S): 160; 4.5 AEROSOL RESPIRATORY (INHALATION) at 09:22

## 2024-05-08 RX ADMIN — OXYCODONE HYDROCHLORIDE 10 MILLIGRAM(S): 5 TABLET ORAL at 08:17

## 2024-05-08 RX ADMIN — ALBUTEROL 2.5 MILLIGRAM(S): 90 AEROSOL, METERED ORAL at 04:28

## 2024-05-08 RX ADMIN — ALBUTEROL 2.5 MILLIGRAM(S): 90 AEROSOL, METERED ORAL at 09:21

## 2024-05-08 RX ADMIN — OXYCODONE HYDROCHLORIDE 5 MILLIGRAM(S): 5 TABLET ORAL at 00:00

## 2024-05-08 NOTE — DIETITIAN INITIAL EVALUATION ADULT - OTHER CALCULATIONS
Dosing weight (5/6) 227.9 lbs / 103.4 kg.  Recent chart weight (1/11) 225 lbs.  Ideal Body Weight: 125 lbs / 56.6 kg +/-10%

## 2024-05-08 NOTE — DIETITIAN INITIAL EVALUATION ADULT - OTHER INFO
Per chart, pt is 63 year old female PMH HTN, asthma, hypothyroidism s/p FB, robot-assisted LVATS LLL medial basilar segmentectomy, CLEO wedge resection, pleural biopsy, MLND (5/6). Pending discharge home today.    Pt confirms NKFA, denies difficulties chewing/swallowing. Pt lives at home, independent with ADLs PTA. Pt reports good appetite/PO intake in house, tolerating diet. No noted GI distress, last BM 5/6 per flowsheets; ordered for senna 2 tablets qHS and Miralax 17 gm qD.

## 2024-05-08 NOTE — DISCHARGE NOTE NURSING/CASE MANAGEMENT/SOCIAL WORK - PATIENT PORTAL LINK FT
You can access the FollowMyHealth Patient Portal offered by [facility] by registering at the following website: [email or web address]. By joining [facility]’s FollowMyHealth portal, you will also be able to view your health information using other applications (apps) compatible with our system.

## 2024-05-08 NOTE — DISCHARGE NOTE NURSING/CASE MANAGEMENT/SOCIAL WORK - NSDCPEFALRISK_GEN_ALL_CORE
For information on Fall & Injury Prevention, visit: [email or web address] OR  [email or web address] OR  https://www.cdc.gov/steadi/patient.html

## 2024-05-08 NOTE — PROGRESS NOTE ADULT - SUBJECTIVE AND OBJECTIVE BOX
MICAELA CARPIO      63y   Female   MRN-[id number]         No Known Allergies             Daily     Daily Drug Dosing Weight  Height (cm): 165.1 (06 May 2024 09:16)  Weight (kg): 103.4 (06 May 2024 09:16)  BMI (kg/m2): 37.9 (06 May 2024 09:16)  BSA (m2): 2.09 (06 May 2024 09:16)    64 y/o F with H/O: HTN, Asthma (pt denies prior hospitalizations or intubation), Hypothyroidism, Obesity presents to [facility] for pre op evaluation with C/O chest pain s/p Covid infections the end of Dec, 2023. Pt was admitted to the hosp. in [place]. Pre op diagnosis: other nonspecific abnormal finding of lung field. Now schedule for left Video Assisted Thoracoscopic surgery (VATS), robotic assisted, left upper lobe wedge resection with IR marking left upper lobe series 4, image 80, left lower lobe wedge resection, possible segmentectomy, mediastinal lymph node dissection tentatively on 05/06/24.  CHIEF COMPLAINT: Follow up in ICU  for postoperative care of patient who is s/p lung surgery    Procedure: FB, Robot-assisted LVATS LLL medial basilar segmentectomy, CLEO wedge resection, pleural biopsy, MLND 5/6/2024                       Issues:              Lung nodule              Postop pain              HTN (hypertension)  Hypothyroidism  Asthma  Obesity    Postop course:     Patient reports moderate pain at chest wall incision sites which is worse with coughing and deep breathing without associated fever or dyspnea. Pain is improved with use of PCA and  oral pain meds.         Home Medications:  amLODIPine 10 mg oral tablet: 1 tab(s) orally once a day (06 May 2024 09:30)  Breo Ellipta 200 mcg-25 mcg/inh inhalation powder: 1 puff(s) inhaled once a day (06 May 2024 09:30)  Synthroid 50 mcg (0.05 mg) oral tablet: 1 tab(s) orally once a day (06 May 2024 09:30)  Vitamin D 1 cap po daily:  (06 May 2024 09:30)    PAST MEDICAL & SURGICAL HISTORY:  HTN (hypertension)      Hypothyroidism      Asthma      Obesity      History of tubal ligation        Vital Signs Last 24 Hrs  T(C): 36.8 (08 May 2024 08:00), Max: 37.2 (08 May 2024 04:00)  T(F): 98.2 (08 May 2024 08:00), Max: 99 (08 May 2024 04:00)  HR: 87 (08 May 2024 08:00) (71 - 87)  BP: 129/68 (08 May 2024 08:00) (110/86 - 139/89)  BP(mean): 86 (08 May 2024 08:00) (80 - 105)  RR: 25 (08 May 2024 08:00) (14 - 25)  SpO2: 98% (08 May 2024 08:00) (92% - 100%)    Parameters below as of 08 May 2024 08:00  Patient On (Oxygen Delivery Method): room air      I&O's Detail    07 May 2024 07:01  -  08 May 2024 07:00  --------------------------------------------------------  IN:    IV PiggyBack: 200 mL    Lactated Ringers: 150 mL    Oral Fluid: 720 mL  Total IN: 1070 mL    OUT:    Chest Tube (mL): 20 mL    Voided (mL): 1900 mL  Total OUT: 1920 mL    Total NET: -850 mL      08 May 2024 07:01  -  08 May 2024 09:01  --------------------------------------------------------  IN:    Oral Fluid: 200 mL  Total IN: 200 mL    OUT:  Total OUT: 0 mL    Total NET: 200 mL        CAPILLARY BLOOD GLUCOSE        Home Medications:  amLODIPine 10 mg oral tablet: 1 tab(s) orally once a day (06 May 2024 09:30)  Breo Ellipta 200 mcg-25 mcg/inh inhalation powder: 1 puff(s) inhaled once a day (06 May 2024 09:30)  Synthroid 50 mcg (0.05 mg) oral tablet: 1 tab(s) orally once a day (06 May 2024 09:30)  Vitamin D 1 cap po daily:  (06 May 2024 09:30)    MEDICATIONS  (STANDING):  albuterol    0.083% 2.5 milliGRAM(s) Nebulizer every 6 hours  budesonide 160 MICROgram(s)/formoterol 4.5 MICROgram(s) Inhaler 2 Puff(s) Inhalation two times a day  dornase london Solution 2.5 milliGRAM(s) Inhalation daily  heparin   Injectable 5000 Unit(s) SubCutaneous every 8 hours  levothyroxine 50 MICROGram(s) Oral daily  lidocaine   4% Patch 1 Patch Transdermal daily  polyethylene glycol 3350 17 Gram(s) Oral daily  senna 2 Tablet(s) Oral at bedtime  sodium chloride 3%  Inhalation 4 milliLiter(s) Inhalation every 6 hours    MEDICATIONS  (PRN):  calcium carbonate    500 mG (Tums) Chewable 1 Tablet(s) Chew every 4 hours PRN Upset Stomach  diphenhydrAMINE Injectable 25 milliGRAM(s) IV Push every 4 hours PRN Pruritus  naloxone Injectable 0.1 milliGRAM(s) IV Push every 3 minutes PRN For ANY of the following changes in patient status:  A. RR LESS THAN 10 breaths per minute, B. Oxygen saturation LESS THAN 90%, C. Sedation score of 6  ondansetron Injectable 4 milliGRAM(s) IV Push every 6 hours PRN Nausea  oxyCODONE    IR 10 milliGRAM(s) Oral every 4 hours PRN Severe Pain (7 - 10)  oxyCODONE    IR 5 milliGRAM(s) Oral every 4 hours PRN Moderate Pain (4 - 6)        Physical exam:                        General:               Pt is awake, alert,  appears to be in pain but not in distress                                                  Neuro:                  Nonfocal                             Psych:                   A&Ox3                          Cardiovascular:   S1 & S2, regular                           Respiratory:         Air entry is fair and equal on both sides, has bilateral conducted sounds                           GI:                          Soft, nondistended and nontender, Bowel sounds active                            Ext:                        No cyanosis or edema         Labs:                                                                           13.4   8.35  )-----------( 242      ( 08 May 2024 03:15 )             41.6             05-08    135  |  100  |  17  ----------------------------<  117<H>  4.8   |  23  |  0.64    Ca    8.7      08 May 2024 03:15  Phos  2.6     05-08  Mg     2.10     05-08                        Urinalysis Basic - ( 08 May 2024 03:15 )    Color: x / Appearance: x / SG: x / pH: x  Gluc: 117 mg/dL / Ketone: x  / Bili: x / Urobili: x   Blood: x / Protein: x / Nitrite: x   Leuk Esterase: x / RBC: x / WBC x   Sq Epi: x / Non Sq Epi: x / Bacteria: x        CXR:  < from: Xray Chest 1 View- PORTABLE-Urgent (Xray Chest 1 View- PORTABLE-Urgent .) (05.07.24 @ 12:47) >    Low lung volumes with bibasilar atelectasis. Otherwise grossly clear   lungs.  Status post left chest tube removal. No pneumothorax or large pleural   effusion.  Heart size cannot be accurately assessed in this projection.  Small left chest wall subcutaneous emphysema.    IMPRESSION:    Status post left chest tube removal. No pneumothorax.      Plan:  General:   63yFemale s/p  FB, Robot-assisted LVATS LLL medial basilar segmentectomy, CLEO wedge resection, pleural biopsy, MLND, experiencing  pain with deep breathing.                             Neuro:                                         Pain control with Oxy  /  Tylenol PRN / Lidopatch                            Cardiovascular:                                          Telemetry (medical test) - Reviewed by me today independently. Pt is in normal sinus rhythm.                                         HTN: Continue hemodynamic monitoring and restart Norvasc                                        Continue hemodynamic monitoring to prevent decompensation.                            Respiratory:                                         Postop hypoxemia requiring O2 via nasal cannula probably due to postop pain - Wean nasal cannula for goal O2sat above 92%.                                              Obtain CXR . Encourage incentive spirometry.                                                   Chest PT and frequent suctioning.                                          Asthma: Continue bronchodilators, inhaled steroids, Pulmozyme and inhaled 3% saline inhalations.                                         OOB to chair & ambulate w/ assistance.                                          Continuous pulse oximetry for support & to prevent decompensation.                                                                                                                            GI                                         On regular diet as tolerated                                         Continue G.I prophylaxis with Protonix                                          Continue Zofran / Reglan for nausea - PRN                                         Continue bowel regimen	                                                                 Renal:                                                                                 Monitor I/Os and electrolytes                                                                                        Hem/ Onc:                                         DVT prophylaxis with SQ Heparin and SCDs                                         Monitor chest tube output &  signs of bleeding.                                          Follow CBC in AM                           Infectious disease:                                            Monitor for fever / leukocytosis.                                          All surgical incision / chest tube  sites look clean                            Endocrine                                             Continue Accu-Checks with coverage                                           Hypothyroidism: Continue Synthroid       Pertinent clinical, laboratory, radiographic, hemodynamic, echocardiographic, respiratory data, microbiologic data and chart were reviewed and analyzed frequently throughout the course of the day and night. Patient seen, examined and plan discussed with CT Surgeon Dr. Fraser / CTICU team during rounds.  OOB to chair and ambulate with physical therapy as tolerated.   Status discussed with patient and updated plan of care.   For d/c home today.   I have spent 50 minutes with this patient  including 20 minutes of time monitoring hemodynamic status, respiratory status and  coordinating care in the ICU.        Jasvir Boles MD

## 2024-05-09 PROBLEM — J45.909 UNSPECIFIED ASTHMA, UNCOMPLICATED: Chronic | Status: ACTIVE | Noted: 2024-05-01

## 2024-05-09 PROBLEM — I10 ESSENTIAL (PRIMARY) HYPERTENSION: Chronic | Status: ACTIVE | Noted: 2024-05-01

## 2024-05-09 PROBLEM — E66.9 OBESITY, UNSPECIFIED: Chronic | Status: ACTIVE | Noted: 2024-05-01

## 2024-05-09 PROBLEM — E03.9 HYPOTHYROIDISM, UNSPECIFIED: Chronic | Status: ACTIVE | Noted: 2024-05-01

## 2024-05-15 LAB — SURGICAL PATHOLOGY STUDY: SIGNIFICANT CHANGE UP

## 2024-05-17 ENCOUNTER — APPOINTMENT (OUTPATIENT)
Dept: RADIOLOGY | Facility: CLINIC | Age: 64
End: 2024-05-17

## 2024-05-20 ENCOUNTER — NON-APPOINTMENT (OUTPATIENT)
Age: 64
End: 2024-05-20

## 2024-05-20 ENCOUNTER — APPOINTMENT (OUTPATIENT)
Dept: THORACIC SURGERY | Facility: CLINIC | Age: 64
End: 2024-05-20
Payer: COMMERCIAL

## 2024-05-20 ENCOUNTER — OUTPATIENT (OUTPATIENT)
Dept: OUTPATIENT SERVICES | Facility: HOSPITAL | Age: 64
LOS: 1 days | End: 2024-05-20
Payer: COMMERCIAL

## 2024-05-20 ENCOUNTER — APPOINTMENT (OUTPATIENT)
Dept: RADIOLOGY | Facility: HOSPITAL | Age: 64
End: 2024-05-20

## 2024-05-20 VITALS
WEIGHT: 230 LBS | HEIGHT: 65 IN | DIASTOLIC BLOOD PRESSURE: 89 MMHG | HEART RATE: 80 BPM | BODY MASS INDEX: 38.32 KG/M2 | RESPIRATION RATE: 16 BRPM | SYSTOLIC BLOOD PRESSURE: 144 MMHG | OXYGEN SATURATION: 97 %

## 2024-05-20 DIAGNOSIS — Z98.51 TUBAL LIGATION STATUS: Chronic | ICD-10-CM

## 2024-05-20 DIAGNOSIS — R91.8 OTHER NONSPECIFIC ABNORMAL FINDING OF LUNG FIELD: ICD-10-CM

## 2024-05-20 PROCEDURE — 99024 POSTOP FOLLOW-UP VISIT: CPT

## 2024-05-20 PROCEDURE — 71046 X-RAY EXAM CHEST 2 VIEWS: CPT | Mod: 26

## 2024-05-21 ENCOUNTER — APPOINTMENT (OUTPATIENT)
Dept: OTOLARYNGOLOGY | Facility: CLINIC | Age: 64
End: 2024-05-21
Payer: COMMERCIAL

## 2024-05-21 VITALS
DIASTOLIC BLOOD PRESSURE: 85 MMHG | BODY MASS INDEX: 38.32 KG/M2 | WEIGHT: 230 LBS | HEART RATE: 74 BPM | OXYGEN SATURATION: 93 % | HEIGHT: 65 IN | SYSTOLIC BLOOD PRESSURE: 134 MMHG

## 2024-05-21 DIAGNOSIS — E04.1 NONTOXIC SINGLE THYROID NODULE: ICD-10-CM

## 2024-05-21 PROCEDURE — 99213 OFFICE O/P EST LOW 20 MIN: CPT

## 2024-05-21 NOTE — HISTORY OF PRESENT ILLNESS
[de-identified] : This is a 63 yro patient with HTN, asthma, hypothyroidism and h/o 9/11 WTC exposure, referred by Dr. Fraser for eval of thyroid findings on PET/CT done for workup of lung nodules. Today pt reports symptoms are the same since last visit.  Reports dysphagia with solid foods for about 6 months. She feels like there's not enough room for the food and air at the same time. Throat feels tight when swallowing and causes cough. Eating slowly is better. Swallowing liquids without issues. No odynophagia, aspiration, dysphonia, or dyspnea. No fever, chills, or weight loss. Taking Synthroid 50mcg for 5 days/week and 100mcg twice/week. Following with endo Dr. Tipton.  s/p lung surgery with Dr. Fraser on 5/6/2024.  Pt states path showed cancerous tumor and pt was advised to repeat imaging in 6 months. Pt seeing oncologist  Dr. Hunter 5/21/24 for consult.

## 2024-05-21 NOTE — PHYSICAL EXAM
[Midline] : trachea located in midline position [Normal] : no rashes [de-identified] : Bilateral thyroid nodule.

## 2024-05-21 NOTE — CONSULT LETTER
[Dear  ___] : Dear  [unfilled], [Consult Letter:] : I had the pleasure of evaluating your patient, [unfilled]. [Please see my note below.] : Please see my note below. [Consult Closing:] : Thank you very much for allowing me to participate in the care of this patient.  If you have any questions, please do not hesitate to contact me. [Sincerely,] : Sincerely, [FreeTextEntry2] : Dr Renetta Fraser [FreeTextEntry3] :  Elijah Jimenez MD, FACS  Otolaryngology-Head and Neck Surgery [employer] [facility]

## 2024-05-21 NOTE — PLAN
[TextEntry] : - H/O diffuse FDG avidity in the thyroid gland on a recent PET CT for lung nodule. - Scan reviewed today. US in the office today showed several isoechoic thyroid nodules, larger on the L side. Prominent LNs with normal features. - Discussed findings with the patient and her  today and recommended USFNA. - Patient with a lung nodule resected by Dr Fraser. Result showed carcinoid tumor. PET CT thyroid images could be 2/2 Hashimoto`s thyroiditis. - Since the L thyroid nodule is over 3 cm, I will send her for USFNA. - Return in 6 months with a new US unless FNA is suspicious.

## 2024-05-23 ENCOUNTER — RESULT REVIEW (OUTPATIENT)
Age: 64
End: 2024-05-23

## 2024-05-23 NOTE — CONSULT LETTER
[Dear  ___] : Dear  [unfilled], [Consult Letter:] : I had the pleasure of evaluating your patient, [unfilled]. [Consult Closing:] : Thank you very much for allowing me to participate in the care of this patient.  If you have any questions, please do not hesitate to contact me. [Sincerely,] : Sincerely, [FreeTextEntry2] : Dr. Quintana (pulm/referring) [FreeTextEntry3] :  Renetta Fraser MD Attending Surgeon Division of Thoracic Surgery , [facility] [employer] [facility]

## 2024-05-23 NOTE — REASON FOR VISIT
[de-identified] : JENS CORONA. Lt VATS, LLL segmentectomy, CLEO wedge, pleural biopsy, MLND [de-identified] : 5/6/24

## 2024-05-23 NOTE — ASSESSMENT
[FreeTextEntry1] : Ms. MICAELA CARPIO, 63 year old female, nonsmoker, w/ hx of asthma, HTN, hypothyroidism and 9/11 WTC exposure, who presented for lung nodules.  CT angiogram of the chest on12/15/2023: - multiple nodules in both lungs measuring up to 1.3cm - air trapping in both lungs  PET/CT on 3/27/2024: - intense diffuse activity in an enlarged thyroid with calcification in the right lobe and masslike isodense enlargement of the left lobe extending down to the retrosternal region (SUV up to 17.1, image 67) - few mildly avid bilateral cervical chain nodes, the largest measuring 1.4 x 1.0 cm left level 2A (SUV 3.7, image 39). - 1.3 x 1.2 cm left lower lobe nodule with mild activity (SUV 2.4, image 117). - additional scattered bilateral nodules some with mild activity, for example right upper lobe (SUV 2.2, image 93) and some below PET detection. - mosaic attenuation of the lungs suggests air trapping.  PFT for 4/3/2024: FVC 69%, FEV1 63%, DLCO 89%  Now s/p FB, R.A. Lt VATS, LLL segmentectomy, CLEO wedge, pleural biopsy, MLND on 5/6/24. Path revealed LLL typical carcinoid 1.4 cm, G1, all margins and LNs are negative, [id number] Lt pleura showed pleural tissue with chronic inflammation as well as foci of acute inflammation. Focal papillary mesothelial hyperplasia is seen.   CXR today--- reviewed. Pt reports well, minimal pain from surgery, denies SOB, cough or CP.   I have reviewed the patient's medical records and diagnostic images at time of this office consultation and have made the following recommendation: 1. Path revealed with pt, typical carcinoid tumor, will refer to Oncology Dr. Hunter.  2. RTC in 6 mons with CT Chest w/o contrast. Discussed the risk of recurrence of the disease and stressed the importance of routine CT scan surveillance, Pt verbalized understanding.   I, Dr. MARTIN, KARSTEN HUI, personally performed the evaluation and management (E/M) services for this established patient who presents today with (a) new problem(s)/exacerbation of (an) existing condition(s).  That E/M includes conducting the examination, assessing all new/exacerbated conditions, and establishing a new plan of care.  Today, my ACP, SARKIS Castaneda-BC was here to observe my evaluation and management services for this new problem/exacerbated condition to be followed going forward.

## 2024-05-29 ENCOUNTER — APPOINTMENT (OUTPATIENT)
Dept: PULMONOLOGY | Facility: CLINIC | Age: 64
End: 2024-05-29
Payer: COMMERCIAL

## 2024-05-29 VITALS
HEART RATE: 74 BPM | HEIGHT: 65 IN | RESPIRATION RATE: 16 BRPM | OXYGEN SATURATION: 94 % | WEIGHT: 226 LBS | SYSTOLIC BLOOD PRESSURE: 146 MMHG | BODY MASS INDEX: 37.65 KG/M2 | DIASTOLIC BLOOD PRESSURE: 84 MMHG

## 2024-05-29 DIAGNOSIS — J45.909 UNSPECIFIED ASTHMA, UNCOMPLICATED: ICD-10-CM

## 2024-05-29 DIAGNOSIS — D3A.090 BENIGN CARCINOID TUMOR OF THE BRONCHUS AND LUNG: ICD-10-CM

## 2024-05-29 DIAGNOSIS — J98.4 OTHER DISORDERS OF LUNG: ICD-10-CM

## 2024-05-29 PROCEDURE — 99215 OFFICE O/P EST HI 40 MIN: CPT

## 2024-05-29 NOTE — DISCUSSION/SUMMARY
[FreeTextEntry1] : PET/CT (PACS, 3/27/24) - 1.3x1.2cm mildly avid left lower lobe nodule; additional scattered bilateral nodules with variable mild activity. Enlarged thyroid w/ diffuse FDG uptake and a right lobe calcification and a mass-like enlargement of the left lobe that extends retrosternally. There is also a few FDG-avid cervical chain LAD with the largest 1.4x1.0cm with SUV 3.7.  CCTA ([facility], 12/18/23) - scattered pulmonary nodules measuring up to 0.8cm (RLL), 0.7cm (inferior RUL), patchy lucencies bilaterally suggesting air trapping; no mediastinal or hilar adenopathy  CTA PE ([facility], 12/15/23) - no mediastinal or hilar adenopathy, multiple bilateral pulmonary nodules, largest 1.3cm in LLL solid and round, 0.8cm RLL solid, and 0.7cm RUL solid; bilateral air trapping; no PE

## 2024-05-29 NOTE — PHYSICAL EXAM
[No Acute Distress] : no acute distress [Normal Oropharynx] : normal oropharynx [Normal Appearance] : normal appearance [Normal Rate/Rhythm] : normal rate/rhythm [Normal S1, S2] : normal s1, s2 [No Resp Distress] : no resp distress [Clear to Auscultation Bilaterally] : clear to auscultation bilaterally [No Abnormalities] : no abnormalities [Normal Gait] : normal gait [No Clubbing] : no clubbing [No Edema] : no edema [Normal Color/ Pigmentation] : normal color/ pigmentation [No Focal Deficits] : no focal deficits [Oriented x3] : oriented x3 [Normal Affect] : normal affect [Normal Rhythm and Effort] : normal rhythm and effort [Surgical scars] : surgical scars [TextBox_68] : diminished BS left base; remaining fields CTAB with good air entry [TextBox_80] : well healing VATS site, non-tender

## 2024-05-29 NOTE — ASSESSMENT
[FreeTextEntry1] : ~age~yo woman w/ PMH asthma and 9/11 WTC exposure who originally presented for evaluation of multiple pulmonary nodules incidentally found during hospitalization in 12/2023, with a dominant 1.3cm LLL nodule that was worrisome for malignancy. After additional workup and imaging, she was referred to thoracic SX and underwent L VATS, LLL segmentectomy, CLEO wedge, MLND and pleural bx on 5/6/24, with pathology of dominant nodule revealing lung carcinoid with adjacent tumorlets consistent with DIPNECH. She is now following post-op VATS.   Data Reviewed: Surgical Pathology - L VATS, LLL segmentectomy, CLEO wedge, MLND, pleural bx (5/6/24) - typical carcinoid tumor w/ adjacent NEC tumorlets and NEC hyperplasia PFT (4/3/24) - PRISm pattern, with no significant BD response. Normal TLC w/ increased RV suggestive of air trapping; normal DLCO Interval CTSX charts (Dr. Fraser)   Impression: #Lung carcinoid G1 [id number] #DIPNECH #Asthma - this hx could actually have been more a manifestation of DIPNECH  Plan: - surveillance CT chest in 6mos (11/2024) - cont albuterol MDI 1-2 puffs q4-6h PRN - cont Breo 1 puff daily with rinsing after; still finds benefit from LABA/ICS controller post-op so will continue - we discussed relationship of DIPNECH and asthma sx at length - cont daily exercise and activity to maintain cardiopulmonary fitness and for post-op rehabilitation - pain control with APAP vs. motrin; suggested adding topical analgesic to pain regimen for multimodal relief  - will cont to coordinate w/ onc and thoracic  RTO 6mos after surveillance CT

## 2024-05-29 NOTE — HISTORY OF PRESENT ILLNESS
[Never] : never [Former] : former [TextBox_4] : ~age~yo woman w/ PMH asthma and  WTC exposure who presents for pulmonary evaluation of multiple nodules found during hospitalization in 2023 while undergoing workup of chest pain.   Was hospitalized mid Dec 2023 at [facility] ([facility] in [place] for CP and underwent workup for NSTEMI including CCTA and CTAPE which revealed multiple solid pulmonary nodules and raised concern of metastatic disease. Per pt she had issues with her troponin level but cardiac workup ultimately was reassuring for non-ischemic disease. She was also found with COVID-19 at that time. She was evaluated by pulmonary, ID and oncology during her hospitalization and there was recommendation she undergo further workup of the nodules including pelvic examinations to assess for occult sources of malignancy. She deferred most of this workup to when she could follow-up with her outpatient PMD.  In the interim, she is up to date on Mammo and PAP screening. Other than briefly smoking as a teenager, she has not smoked cigarettes for the entirety of her adult life. She denies unintentional weight loss or constitutional/B-symptoms. She denies occupational inhalational exposures and denies radon gas exposure. She was exposed to dust during  and says is enrolled in the WTC program but has not been evaluated by providers within the screening program. Denies prior CT chest imaging. Denies personal hx of malignancy. Father  of leukemia but was related to his work developing nuclear weaponry during WW2/Cold War. No lung CA hx in family.   For her asthma, she has been maintained on Advair 250/50 for years which she only uses once a day due to throat irritation and says this generally keeps her controlled. Prior to Advair, she needed to use rescue albuterol multiple times per day. Has not had PFTs in recent memory nor seen pulmonologist for asthma. Has not needed OTONIEL inhaler for years but keeps an old one in her bag. Has not needed oral steroid for asthma in at least 2 years.   SH: Never smoked as an adult, briefly as a teenager Currently works as an  with insurance company Has  WTC dust exposure  [24] Here for follow-up after L VATS, LLL segmentectomy and CLEO wedge w/ MLND and pleural bx for multiple pulmonary nodules 24. Pathology of dominant LLL nodule +lung carcinoid with concomitant findings of adjacent tumorlets consistent with DIPNECH. Had post-op f/u with thoracic last week on . Since diagnosis, she also established with oncology Dr. Hunter and had also seen ENT Dr. Jimenez and Endo Dr. Tipton - latter two in context of thyroid nodularity/mass seen on PET/CT.  Overall doing well post-op. Still has pain described as dull achiness throughout the day but is getting better with time. Using tylenol + motrin for control. Avoiding narcotic. Staying active daily with walking and resumed ADLs. Psychologically feeling better after very challenging and stressful last few weeks/months. Using Breo daily as directed and still finding benefit, especially on more humid days with lower air quality. Has not needed rescue albuterol since last visit.   PMD: Dr. Devin Saleh CTSX: Dr. Fraser ONC: Dr. Hunter

## 2024-05-29 NOTE — CONSULT LETTER
[Dear  ___] : Dear  [unfilled], [Courtesy Letter:] : I had the pleasure of seeing your patient, [unfilled], in my office today. [Please see my note below.] : Please see my note below. [Consult Closing:] : Thank you very much for allowing me to participate in the care of this patient.  If you have any questions, please do not hesitate to contact me. [Sincerely,] : Sincerely, [FreeTextEntry3] : Kavon Quintana,  Pulmonary & Critical Care Medicine [facility] [DrDonna  ___] : Dr. WRIGHT

## 2024-06-04 ENCOUNTER — OUTPATIENT (OUTPATIENT)
Dept: OUTPATIENT SERVICES | Facility: HOSPITAL | Age: 64
LOS: 1 days | End: 2024-06-04
Payer: COMMERCIAL

## 2024-06-04 ENCOUNTER — RESULT REVIEW (OUTPATIENT)
Age: 64
End: 2024-06-04

## 2024-06-04 ENCOUNTER — APPOINTMENT (OUTPATIENT)
Dept: ULTRASOUND IMAGING | Facility: IMAGING CENTER | Age: 64
End: 2024-06-04
Payer: COMMERCIAL

## 2024-06-04 DIAGNOSIS — Z98.51 TUBAL LIGATION STATUS: Chronic | ICD-10-CM

## 2024-06-04 DIAGNOSIS — E04.1 NONTOXIC SINGLE THYROID NODULE: ICD-10-CM

## 2024-06-04 PROCEDURE — 88173 CYTOPATH EVAL FNA REPORT: CPT | Mod: 26

## 2024-06-04 PROCEDURE — 88173 CYTOPATH EVAL FNA REPORT: CPT

## 2024-06-04 PROCEDURE — 10005 FNA BX W/US GDN 1ST LES: CPT

## 2024-06-04 PROCEDURE — 88172 CYTP DX EVAL FNA 1ST EA SITE: CPT

## 2024-06-05 LAB — NON-GYNECOLOGICAL CYTOLOGY STUDY: SIGNIFICANT CHANGE UP

## 2024-06-10 ENCOUNTER — NON-APPOINTMENT (OUTPATIENT)
Age: 64
End: 2024-06-10

## 2024-07-02 ENCOUNTER — TRANSCRIPTION ENCOUNTER (OUTPATIENT)
Age: 64
End: 2024-07-02

## 2024-07-24 ENCOUNTER — APPOINTMENT (OUTPATIENT)
Dept: ENDOCRINOLOGY | Facility: CLINIC | Age: 64
End: 2024-07-24
Payer: COMMERCIAL

## 2024-07-24 VITALS
BODY MASS INDEX: 37.65 KG/M2 | DIASTOLIC BLOOD PRESSURE: 90 MMHG | HEIGHT: 65 IN | WEIGHT: 226 LBS | SYSTOLIC BLOOD PRESSURE: 147 MMHG | HEART RATE: 75 BPM | TEMPERATURE: 97 F | OXYGEN SATURATION: 97 % | RESPIRATION RATE: 16 BRPM

## 2024-07-24 DIAGNOSIS — D3A.090 BENIGN CARCINOID TUMOR OF THE BRONCHUS AND LUNG: ICD-10-CM

## 2024-07-24 DIAGNOSIS — E03.9 HYPOTHYROIDISM, UNSPECIFIED: ICD-10-CM

## 2024-07-24 DIAGNOSIS — E04.1 NONTOXIC SINGLE THYROID NODULE: ICD-10-CM

## 2024-07-24 PROCEDURE — 99214 OFFICE O/P EST MOD 30 MIN: CPT

## 2024-07-24 PROCEDURE — G2211 COMPLEX E/M VISIT ADD ON: CPT | Mod: NC

## 2024-07-24 NOTE — ASSESSMENT
[FreeTextEntry1] : This is a 63 y/o patient with HTN, asthma, hypothyroidism and h/o 9/11 WTC exposure, referred by Dr. Fraser for eval of thyroid findings on PET/CT done for workup of lung nodules.  MNG goiter S/P FNA of LMP nodule, bethesda II, benign.  Will repeat US with Dr. Jimenez.  Hypothyroidism July 2024 TFTs now normal. Feeling better. -Increase levothyroxine to 50mcg daily with two tabs on Sat and Sun -Repeat TFTs in 3 months.  Carcinoid tumor of lung Following with pulm, onc and CTS S/P resection in May 2024 with Dr. Fraser Repeating imaging in 6 months per CTS/onc  RTC 6 months.  David Tipton DO.

## 2024-07-24 NOTE — HISTORY OF PRESENT ILLNESS
[FreeTextEntry1] : This is a 63 y/o patient with HTN, asthma, hypothyroidism and h/o 9/11 [employer] exposure, referred by Dr. Fraser for eval of thyroid findings on PET/CT done for workup of lung nodules.  PET 3/27/2024: Intensely FDG avid enlarged thyroid gland with calcification in the right lobe and masslike isodensity in the left lobe. Further evaluation with ultrasound/percutaneous needle biopsy recommended as clinically indicated.  Scheduled for lung surgery with Dr. Fraser on 5/6/2024.  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  The tumor spans 1.4 cm and is positive for CAM 5.2, chromogranin, synaptophysin and TTF-1 while negative for p40.  Neuroendocrine markers also highlight an adjacent carcinoid tumorlet and neuroendocrine cell hyperplasia.  The margin of this specimen part is positive for carcinoid tumor.  Following with CTS and onc. Plan is to repeat imaging in 6 months.  If eats too quickly starts to cough. Feels like there isn't enough room for both food to pass and air to come up when swallowing. No throat pain. No choking or SOB when on her back. No changes to voice. Following with Dr. Jimenez (ENT). Saw some isoechoic nodules on thyroid gland on POCUS. US-guided FNA showed bethesda II, benign.  Taking Synthroid 50mcg PO daily on empty stomach with two tabs on Sat and Sun. Feeling better on this dose. TFTs controlled. Takes Vit D only. No use of supplements otherwise, lithium, amiodarone, hx of radiation to head or neck. No prior MENG, thyroid surgery or prior treatment of hyperthyroidism. No hx of CAD, arrhythmia, CHF.  Cold intolerance improved a bit. Energy better. No recent weight change. Normal BMs.   PMHx: As above. PSHx: None Meds: Synthroid, vit d, amlodipine, breo ellipta FHx: No known FHx of thyroid disease.  Dec 2023 labs showed TSH 5.280 and FT4 0.86. April 2024 labs reviewed.  Electrolytes normal.  TSH mildly elevated at 5.41.  No free T4 available.  Thyroglobulin antibodies negative. July 2024 TFTs normal.

## 2024-07-29 ENCOUNTER — TRANSCRIPTION ENCOUNTER (OUTPATIENT)
Age: 64
End: 2024-07-29

## 2024-11-12 ENCOUNTER — NON-APPOINTMENT (OUTPATIENT)
Age: 64
End: 2024-11-12

## 2024-11-12 ENCOUNTER — APPOINTMENT (OUTPATIENT)
Dept: CT IMAGING | Facility: CLINIC | Age: 64
End: 2024-11-12
Payer: COMMERCIAL

## 2024-11-12 PROCEDURE — 71250 CT THORAX DX C-: CPT

## 2024-11-19 ENCOUNTER — APPOINTMENT (OUTPATIENT)
Dept: THORACIC SURGERY | Facility: CLINIC | Age: 64
End: 2024-11-19
Payer: COMMERCIAL

## 2024-11-19 VITALS
DIASTOLIC BLOOD PRESSURE: 85 MMHG | WEIGHT: 226 LBS | OXYGEN SATURATION: 97 % | RESPIRATION RATE: 16 BRPM | BODY MASS INDEX: 37.65 KG/M2 | SYSTOLIC BLOOD PRESSURE: 147 MMHG | HEART RATE: 72 BPM | HEIGHT: 65 IN

## 2024-11-19 DIAGNOSIS — D3A.090 BENIGN CARCINOID TUMOR OF THE BRONCHUS AND LUNG: ICD-10-CM

## 2024-11-19 PROCEDURE — 99213 OFFICE O/P EST LOW 20 MIN: CPT

## 2024-12-06 ENCOUNTER — APPOINTMENT (OUTPATIENT)
Dept: ULTRASOUND IMAGING | Facility: CLINIC | Age: 64
End: 2024-12-06
Payer: COMMERCIAL

## 2024-12-06 PROCEDURE — 76536 US EXAM OF HEAD AND NECK: CPT

## 2024-12-11 ENCOUNTER — APPOINTMENT (OUTPATIENT)
Dept: PULMONOLOGY | Facility: CLINIC | Age: 64
End: 2024-12-11
Payer: COMMERCIAL

## 2024-12-11 VITALS
WEIGHT: 227 LBS | BODY MASS INDEX: 37.82 KG/M2 | HEART RATE: 71 BPM | HEIGHT: 65 IN | RESPIRATION RATE: 16 BRPM | DIASTOLIC BLOOD PRESSURE: 83 MMHG | OXYGEN SATURATION: 95 % | TEMPERATURE: 97.9 F | SYSTOLIC BLOOD PRESSURE: 142 MMHG

## 2024-12-11 DIAGNOSIS — J98.4 OTHER DISORDERS OF LUNG: ICD-10-CM

## 2024-12-11 DIAGNOSIS — J45.909 UNSPECIFIED ASTHMA, UNCOMPLICATED: ICD-10-CM

## 2024-12-11 DIAGNOSIS — D3A.090 BENIGN CARCINOID TUMOR OF THE BRONCHUS AND LUNG: ICD-10-CM

## 2024-12-11 PROCEDURE — 99214 OFFICE O/P EST MOD 30 MIN: CPT

## 2024-12-17 ENCOUNTER — APPOINTMENT (OUTPATIENT)
Dept: OTOLARYNGOLOGY | Facility: CLINIC | Age: 64
End: 2024-12-17

## 2024-12-20 ENCOUNTER — NON-APPOINTMENT (OUTPATIENT)
Age: 64
End: 2024-12-20

## 2024-12-23 ENCOUNTER — TRANSCRIPTION ENCOUNTER (OUTPATIENT)
Age: 64
End: 2024-12-23

## 2024-12-24 ENCOUNTER — TRANSCRIPTION ENCOUNTER (OUTPATIENT)
Age: 64
End: 2024-12-24

## 2024-12-26 RX ORDER — ALBUTEROL SULFATE 90 UG/1
108 (90 BASE) INHALANT RESPIRATORY (INHALATION)
Qty: 1 | Refills: 3 | Status: ACTIVE | COMMUNITY
Start: 2024-12-19 | End: 1900-01-01

## 2025-02-12 ENCOUNTER — APPOINTMENT (OUTPATIENT)
Dept: ENDOCRINOLOGY | Facility: CLINIC | Age: 65
End: 2025-02-12
Payer: COMMERCIAL

## 2025-02-12 VITALS
HEART RATE: 75 BPM | BODY MASS INDEX: 38.65 KG/M2 | DIASTOLIC BLOOD PRESSURE: 85 MMHG | OXYGEN SATURATION: 95 % | RESPIRATION RATE: 16 BRPM | SYSTOLIC BLOOD PRESSURE: 145 MMHG | WEIGHT: 232 LBS | TEMPERATURE: 97.6 F | HEIGHT: 65 IN

## 2025-02-12 DIAGNOSIS — D3A.090 BENIGN CARCINOID TUMOR OF THE BRONCHUS AND LUNG: ICD-10-CM

## 2025-02-12 DIAGNOSIS — E04.1 NONTOXIC SINGLE THYROID NODULE: ICD-10-CM

## 2025-02-12 DIAGNOSIS — E03.9 HYPOTHYROIDISM, UNSPECIFIED: ICD-10-CM

## 2025-02-12 DIAGNOSIS — R59.0 LOCALIZED ENLARGED LYMPH NODES: ICD-10-CM

## 2025-02-12 LAB
T4 FREE SERPL-MCNC: 1.1 NG/DL
TSH SERPL-ACNC: 3.85 UIU/ML

## 2025-02-12 PROCEDURE — 99214 OFFICE O/P EST MOD 30 MIN: CPT

## 2025-02-12 PROCEDURE — G2211 COMPLEX E/M VISIT ADD ON: CPT | Mod: NC

## 2025-03-17 ENCOUNTER — RX RENEWAL (OUTPATIENT)
Age: 65
End: 2025-03-17

## 2025-04-29 ENCOUNTER — NON-APPOINTMENT (OUTPATIENT)
Age: 65
End: 2025-04-29

## 2025-04-30 ENCOUNTER — APPOINTMENT (OUTPATIENT)
Dept: PULMONOLOGY | Facility: CLINIC | Age: 65
End: 2025-04-30
Payer: COMMERCIAL

## 2025-04-30 VITALS
HEIGHT: 65 IN | BODY MASS INDEX: 38.65 KG/M2 | TEMPERATURE: 97.1 F | SYSTOLIC BLOOD PRESSURE: 138 MMHG | OXYGEN SATURATION: 95 % | RESPIRATION RATE: 16 BRPM | HEART RATE: 71 BPM | WEIGHT: 232 LBS | DIASTOLIC BLOOD PRESSURE: 86 MMHG

## 2025-04-30 DIAGNOSIS — J45.909 UNSPECIFIED ASTHMA, UNCOMPLICATED: ICD-10-CM

## 2025-04-30 DIAGNOSIS — J98.4 OTHER DISORDERS OF LUNG: ICD-10-CM

## 2025-04-30 DIAGNOSIS — D3A.090 BENIGN CARCINOID TUMOR OF THE BRONCHUS AND LUNG: ICD-10-CM

## 2025-04-30 PROCEDURE — G2211 COMPLEX E/M VISIT ADD ON: CPT | Mod: NC

## 2025-04-30 PROCEDURE — 99214 OFFICE O/P EST MOD 30 MIN: CPT

## 2025-05-13 ENCOUNTER — APPOINTMENT (OUTPATIENT)
Dept: PULMONOLOGY | Facility: CLINIC | Age: 65
End: 2025-05-13
Payer: COMMERCIAL

## 2025-05-13 ENCOUNTER — APPOINTMENT (OUTPATIENT)
Dept: CT IMAGING | Facility: CLINIC | Age: 65
End: 2025-05-13
Payer: COMMERCIAL

## 2025-05-13 VITALS
SYSTOLIC BLOOD PRESSURE: 134 MMHG | RESPIRATION RATE: 16 BRPM | TEMPERATURE: 97.6 F | OXYGEN SATURATION: 96 % | HEART RATE: 67 BPM | BODY MASS INDEX: 38.99 KG/M2 | DIASTOLIC BLOOD PRESSURE: 85 MMHG | WEIGHT: 234 LBS | HEIGHT: 65 IN

## 2025-05-13 PROCEDURE — 94729 DIFFUSING CAPACITY: CPT

## 2025-05-13 PROCEDURE — 71250 CT THORAX DX C-: CPT

## 2025-05-13 PROCEDURE — 94010 BREATHING CAPACITY TEST: CPT

## 2025-05-13 PROCEDURE — 94726 PLETHYSMOGRAPHY LUNG VOLUMES: CPT

## 2025-05-14 VITALS
TEMPERATURE: 98.4 F | OXYGEN SATURATION: 94 % | BODY MASS INDEX: 42.82 KG/M2 | HEART RATE: 84 BPM | SYSTOLIC BLOOD PRESSURE: 129 MMHG | DIASTOLIC BLOOD PRESSURE: 88 MMHG | RESPIRATION RATE: 16 BRPM | HEIGHT: 65 IN | WEIGHT: 257 LBS

## 2025-05-20 ENCOUNTER — APPOINTMENT (OUTPATIENT)
Dept: THORACIC SURGERY | Facility: CLINIC | Age: 65
End: 2025-05-20
Payer: COMMERCIAL

## 2025-05-20 DIAGNOSIS — D3A.090 BENIGN CARCINOID TUMOR OF THE BRONCHUS AND LUNG: ICD-10-CM

## 2025-05-20 PROCEDURE — 99213 OFFICE O/P EST LOW 20 MIN: CPT | Mod: 93

## 2025-06-03 ENCOUNTER — APPOINTMENT (OUTPATIENT)
Dept: CT IMAGING | Facility: CLINIC | Age: 65
End: 2025-06-03
Payer: COMMERCIAL

## 2025-06-03 PROCEDURE — 74177 CT ABD & PELVIS W/CONTRAST: CPT

## 2025-06-06 ENCOUNTER — APPOINTMENT (OUTPATIENT)
Dept: ULTRASOUND IMAGING | Facility: CLINIC | Age: 65
End: 2025-06-06
Payer: COMMERCIAL

## 2025-06-06 PROCEDURE — 76536 US EXAM OF HEAD AND NECK: CPT

## 2025-06-09 ENCOUNTER — APPOINTMENT (OUTPATIENT)
Dept: THORACIC SURGERY | Facility: CLINIC | Age: 65
End: 2025-06-09
Payer: COMMERCIAL

## 2025-06-09 PROCEDURE — 99213 OFFICE O/P EST LOW 20 MIN: CPT | Mod: 93

## 2025-06-17 ENCOUNTER — APPOINTMENT (OUTPATIENT)
Dept: OTOLARYNGOLOGY | Facility: CLINIC | Age: 65
End: 2025-06-17

## 2025-06-26 ENCOUNTER — NON-APPOINTMENT (OUTPATIENT)
Age: 65
End: 2025-06-26

## 2025-06-27 ENCOUNTER — NON-APPOINTMENT (OUTPATIENT)
Age: 65
End: 2025-06-27

## 2025-06-27 ENCOUNTER — APPOINTMENT (OUTPATIENT)
Dept: SURGICAL ONCOLOGY | Facility: CLINIC | Age: 65
End: 2025-06-27

## 2025-06-27 VITALS
WEIGHT: 232 LBS | BODY MASS INDEX: 38.65 KG/M2 | OXYGEN SATURATION: 96 % | HEART RATE: 74 BPM | DIASTOLIC BLOOD PRESSURE: 87 MMHG | SYSTOLIC BLOOD PRESSURE: 135 MMHG | HEIGHT: 65 IN

## 2025-06-27 PROCEDURE — 99203 OFFICE O/P NEW LOW 30 MIN: CPT

## 2025-07-09 ENCOUNTER — OUTPATIENT (OUTPATIENT)
Dept: OUTPATIENT SERVICES | Facility: HOSPITAL | Age: 65
LOS: 1 days | End: 2025-07-09

## 2025-07-09 VITALS
HEART RATE: 63 BPM | RESPIRATION RATE: 16 BRPM | OXYGEN SATURATION: 97 % | TEMPERATURE: 98 F | DIASTOLIC BLOOD PRESSURE: 89 MMHG | HEIGHT: 64 IN | WEIGHT: 227.08 LBS | SYSTOLIC BLOOD PRESSURE: 140 MMHG

## 2025-07-09 DIAGNOSIS — Z98.890 OTHER SPECIFIED POSTPROCEDURAL STATES: Chronic | ICD-10-CM

## 2025-07-09 DIAGNOSIS — Z98.51 TUBAL LIGATION STATUS: Chronic | ICD-10-CM

## 2025-07-09 DIAGNOSIS — E03.9 HYPOTHYROIDISM, UNSPECIFIED: ICD-10-CM

## 2025-07-09 DIAGNOSIS — K82.4 CHOLESTEROLOSIS OF GALLBLADDER: ICD-10-CM

## 2025-07-09 DIAGNOSIS — I10 ESSENTIAL (PRIMARY) HYPERTENSION: ICD-10-CM

## 2025-07-09 DIAGNOSIS — Z91.89 OTHER SPECIFIED PERSONAL RISK FACTORS, NOT ELSEWHERE CLASSIFIED: ICD-10-CM

## 2025-07-09 NOTE — H&P PST ADULT - NSICDXPASTMEDICALHX_GEN_ALL_CORE_FT
PAST MEDICAL HISTORY:  Asthma     Cholesterolosis of gallbladder     HTN (hypertension)     Hypothyroidism     Lung cancer     Multiple thyroid nodules     Obesity

## 2025-07-09 NOTE — H&P PST ADULT - NEGATIVE ENMT SYMPTOMS
Denies dentures. Denies loose teeth./no hearing difficulty/no ear pain/no tinnitus/no vertigo/no sinus symptoms/no nasal congestion/no nasal discharge/no nose bleeds/no recurrent cold sores/no abnormal taste sensation/no dry mouth/no throat pain/no dysphagia

## 2025-07-09 NOTE — H&P PST ADULT - HISTORY OF PRESENT ILLNESS
62 y/o F with H/O: HTN, Asthma, Hypothyroidism, W[employer] exposure,  LLL typical carcinoid s/p left VATS, robotic assisted, left upper lobe wedge resection left lower lobe wedge resection, possible segmentectomy, mediastinal lymph node dissection (2024), presents for preop evaluation for diagnosis of Cholesterolosis of Gallbladder. Pt had CT abd/pelvis May 2025 which showed a Mild interval enlargement of a soft tissue nodule abutting the gallbladder wall compared to 12/15/2023. This remains indeterminate but gallbladder neoplasm is not entirely excluded. Denies abdominal pain, tenderness, n/v/d/c, fevers, cp, sob, majano, dizziness, palpitations.

## 2025-07-09 NOTE — H&P PST ADULT - PROBLEM SELECTOR PLAN 1
Patient tentatively scheduled for Robotic cholecystectomy on 7/14/25.  Pre-op instructions provided. Pt given verbal and written instructions with teach back on chlorhexidine shampoo. Pt verbalized understanding with return demonstration.   RANDI in [facility]

## 2025-07-09 NOTE — H&P PST ADULT - MUSCULOSKELETAL
ROM intact/no joint erythema/no joint warmth/no calf tenderness/normal gait/strength 5/5 bilateral upper extremities details…

## 2025-07-09 NOTE — H&P PST ADULT - NEGATIVE MUSCULOSKELETAL SYMPTOMS
no arthritis/no joint swelling/no myalgia/no muscle cramps/no muscle weakness/no stiffness/no neck pain/no back pain/no leg pain R

## 2025-07-09 NOTE — H&P PST ADULT - NSICDXPASTSURGICALHX_GEN_ALL_CORE_FT
New to urology patient has ASAP referral for Acute prostatitis.    Please advise on scheduling.  
PAST SURGICAL HISTORY:  History of lung surgery     History of tubal ligation

## 2025-07-09 NOTE — H&P PST ADULT - NSICDXFAMILYHX_GEN_ALL_CORE_FT
FAMILY HISTORY:  FH: bladder cancer    Father  Still living? Unknown  FH: leukemia, Age at diagnosis: Age Unknown

## 2025-07-11 NOTE — ASU PATIENT PROFILE, ADULT - AS SC BRADEN FRICTION
(3) no apparent problem Cosentyx Counseling:  I discussed with the patient the risks of Cosentyx including but not limited to worsening of Crohn's disease, immunosuppression, allergic reactions and infections.  The patient understands that monitoring is required including a PPD at baseline and must alert us or the primary physician if symptoms of infection or other concerning signs are noted.

## 2025-07-11 NOTE — ASU PATIENT PROFILE, ADULT - FALL HARM RISK - UNIVERSAL INTERVENTIONS
Bed in lowest position, wheels locked, appropriate side rails in place/Call bell, personal items and telephone in reach/Instruct patient to call for assistance before getting out of bed or chair/Non-slip footwear when patient is out of bed/[place] to call system/Physically safe environment - no spills, clutter or unnecessary equipment/Purposeful Proactive Rounding/Room/bathroom lighting operational, light cord in reach

## 2025-07-11 NOTE — ASU PATIENT PROFILE, ADULT - REASON FOR ADMISSION, PROFILE
Patient Instructions from Today's Visit    Reason for Visit:  Follow Up    Plan:  Labs reviewed. Will discuss with Dr. Spike Donald, our radiation oncologist, and a neurosurgeon. Recommend Metamucil or Benefiber over the counter for added fiber to help with bowel movements. Prescribing prednisone 40mg to start and then taper. Let us know if the steroids work for your pain. Referral to Dr. Lizbeth Ware - hand surgeon. Get in touch with Dr. Samara Phoenix with [facility] if you wish. Prescribing protonix while you are on the steroids.     Follow Up:  2-3 months    Recent Lab Results:  Hospital Outpatient Visit on 05/02/2023   Component Date Value Ref Range Status    WBC 05/02/2023 5.7  4.3 - 11.1 K/uL Final    RBC 05/02/2023 4.13  4.05 - 5.2 M/uL Final    Hemoglobin 05/02/2023 12.4  11.7 - 15.4 g/dL Final    Hematocrit 05/02/2023 38.5  35.8 - 46.3 % Final    MCV 05/02/2023 93.2  82.0 - 102.0 FL Final    MCH 05/02/2023 30.0  26.1 - 32.9 PG Final    MCHC 05/02/2023 32.2  31.4 - 35.0 g/dL Final    RDW 05/02/2023 14.1  11.9 - 14.6 % Final    Platelets 94/87/7035 299  150 - 450 K/uL Final    MPV 05/02/2023 9.0 (L)  9.4 - 12.3 FL Final    nRBC 05/02/2023 0.00  0.0 - 0.2 K/uL Final    **Note: Absolute NRBC parameter is now reported with Hemogram**    Seg Neutrophils 05/02/2023 77  43 - 78 % Final    Lymphocytes 05/02/2023 14  13 - 44 % Final    Monocytes 05/02/2023 8  4.0 - 12.0 % Final    Eosinophils % 05/02/2023 0 (L)  0.5 - 7.8 % Final    Basophils 05/02/2023 0  0.0 - 2.0 % Final    Immature Granulocytes 05/02/2023 1  0.0 - 5.0 % Final    Segs Absolute 05/02/2023 4.4  1.7 - 8.2 K/UL Final    Absolute Lymph # 05/02/2023 0.8  0.5 - 4.6 K/UL Final    Absolute Mono # 05/02/2023 0.5  0.1 - 1.3 K/UL Final    Absolute Eos # 05/02/2023 0.0  0.0 - 0.8 K/UL Final    Basophils Absolute 05/02/2023 0.0  0.0 - 0.2 K/UL Final    Absolute Immature Granulocyte 05/02/2023 0.0  0.0 - 0.5 K/UL Final    Differential Type 05/02/2023 gallbladder 'I'm having gallbladder sx'

## 2025-07-14 ENCOUNTER — APPOINTMENT (OUTPATIENT)
Dept: SURGICAL ONCOLOGY | Facility: HOSPITAL | Age: 65
End: 2025-07-14

## 2025-07-14 ENCOUNTER — TRANSCRIPTION ENCOUNTER (OUTPATIENT)
Age: 65
End: 2025-07-14

## 2025-07-14 ENCOUNTER — OUTPATIENT (OUTPATIENT)
Dept: INPATIENT UNIT | Facility: HOSPITAL | Age: 65
LOS: 1 days | End: 2025-07-14
Payer: COMMERCIAL

## 2025-07-14 ENCOUNTER — RESULT REVIEW (OUTPATIENT)
Age: 65
End: 2025-07-14

## 2025-07-14 VITALS
OXYGEN SATURATION: 96 % | RESPIRATION RATE: 12 BRPM | HEART RATE: 67 BPM | DIASTOLIC BLOOD PRESSURE: 68 MMHG | SYSTOLIC BLOOD PRESSURE: 134 MMHG

## 2025-07-14 VITALS
WEIGHT: 227.08 LBS | OXYGEN SATURATION: 97 % | HEIGHT: 64 IN | TEMPERATURE: 98 F | SYSTOLIC BLOOD PRESSURE: 119 MMHG | RESPIRATION RATE: 14 BRPM | HEART RATE: 64 BPM | DIASTOLIC BLOOD PRESSURE: 74 MMHG

## 2025-07-14 DIAGNOSIS — Z98.890 OTHER SPECIFIED POSTPROCEDURAL STATES: Chronic | ICD-10-CM

## 2025-07-14 DIAGNOSIS — Z98.51 TUBAL LIGATION STATUS: Chronic | ICD-10-CM

## 2025-07-14 DIAGNOSIS — K82.4 CHOLESTEROLOSIS OF GALLBLADDER: ICD-10-CM

## 2025-07-14 PROCEDURE — 88313 SPECIAL STAINS GROUP 2: CPT | Mod: 26

## 2025-07-14 PROCEDURE — 88304 TISSUE EXAM BY PATHOLOGIST: CPT | Mod: 26

## 2025-07-14 PROCEDURE — 47120 PARTIAL REMOVAL OF LIVER: CPT

## 2025-07-14 DEVICE — LIGATING CLIPS WECK HEMOLOK POLYMER LARGE (PURPLE) 6: Type: IMPLANTABLE DEVICE | Status: FUNCTIONAL

## 2025-07-14 DEVICE — LIGATING CLIPS WECK HEMOLOK POLYMER MEDIUM-LARGE (GREEN) 6: Type: IMPLANTABLE DEVICE | Status: FUNCTIONAL

## 2025-07-14 RX ORDER — SODIUM CHLORIDE 9 G/1000ML
1000 INJECTION, SOLUTION INTRAVENOUS
Refills: 0 | Status: DISCONTINUED | OUTPATIENT
Start: 2025-07-14 | End: 2025-07-14

## 2025-07-14 RX ORDER — HYDROMORPHONE/SOD CHLOR,ISO/PF 2 MG/10 ML
0.5 SYRINGE (ML) INJECTION
Refills: 0 | Status: DISCONTINUED | OUTPATIENT
Start: 2025-07-14 | End: 2025-07-14

## 2025-07-14 RX ORDER — ALBUTEROL SULFATE 2.5 MG/3ML
2 VIAL, NEBULIZER (ML) INHALATION
Refills: 0 | DISCHARGE

## 2025-07-14 RX ORDER — ONDANSETRON HCL/PF 4 MG/2 ML
4 VIAL (ML) INJECTION ONCE
Refills: 0 | Status: DISCONTINUED | OUTPATIENT
Start: 2025-07-14 | End: 2025-07-28

## 2025-07-14 RX ORDER — OXYCODONE HYDROCHLORIDE AND ACETAMINOPHEN 10; 325 MG/1; MG/1
1 TABLET ORAL ONCE
Refills: 0 | Status: DISCONTINUED | OUTPATIENT
Start: 2025-07-14 | End: 2025-07-14

## 2025-07-14 RX ORDER — OXYCODONE HYDROCHLORIDE 30 MG/1
1 TABLET ORAL
Qty: 9 | Refills: 0
Start: 2025-07-14 | End: 2025-07-16

## 2025-07-14 RX ORDER — OXYCODONE HYDROCHLORIDE 30 MG/1
5 TABLET ORAL ONCE
Refills: 0 | Status: DISCONTINUED | OUTPATIENT
Start: 2025-07-14 | End: 2025-07-14

## 2025-07-14 RX ADMIN — Medication 0.5 MILLIGRAM(S): at 10:38

## 2025-07-14 RX ADMIN — Medication 0.5 MILLIGRAM(S): at 10:30

## 2025-07-14 RX ADMIN — OXYCODONE HYDROCHLORIDE 5 MILLIGRAM(S): 30 TABLET ORAL at 10:38

## 2025-07-14 RX ADMIN — Medication 0.5 MILLIGRAM(S): at 11:00

## 2025-07-14 RX ADMIN — Medication 0.5 MILLIGRAM(S): at 10:13

## 2025-07-14 RX ADMIN — OXYCODONE HYDROCHLORIDE 5 MILLIGRAM(S): 30 TABLET ORAL at 11:15

## 2025-07-14 NOTE — ASU DISCHARGE PLAN (ADULT/PEDIATRIC) - FINANCIAL ASSISTANCE
[facility] provides services at a reduced cost to those who are determined to be eligible through [facility]’s financial assistance program. Information regarding [facility]’s financial assistance program can be found by going to [email or web address] or by calling [phone].

## 2025-07-14 NOTE — ASU PREOP CHECKLIST - COMMENTS
synthroid & amlodipine @ 04:15 with sips of water montanaoelipta, synthroid & amlodipine @ 04:15 with sips of water

## 2025-07-14 NOTE — ASU DISCHARGE PLAN (ADULT/PEDIATRIC) - ACCOMPANIED BY
Called her back yesterday after work hours to make sure she was doing ok and she was doing much better  We discussed continuing topiramate as she has only been on 50 mg twice a day for the past 2 weeks and it typically takes more time to show improvement  We discussed certainly could consider Botox if needed in the future  All questions answered  Family

## 2025-07-14 NOTE — BRIEF OPERATIVE NOTE - OPERATION/FINDINGS
Robotic cholecystecotmy : Liver bud was found on the gallbladder with a pedicle running into it. The pedicle was [id number] and the gallbladder cystic duct was identified and cliped with 3 Hem-o-locks clips.

## 2025-07-14 NOTE — ASU DISCHARGE PLAN (ADULT/PEDIATRIC) - CARE PROVIDER_API CALL
Thiago Ro  Surgery (General Surgery)  [address] [place] [id number]  Phone: [phone]  Fax: [phone]  Established Patient  Follow Up Time: 2 weeks

## 2025-07-14 NOTE — ASU DISCHARGE PLAN (ADULT/PEDIATRIC) - NURSING INSTRUCTIONS
You received IV Tylenol for pain management at 8:45AM. Please DO NOT take any Tylenol (Acetaminophen) containing products, such as Vicodin, Percocet, Excedrin, and cold medications for the next 6 hours (until 2:45PM). DO NOT TAKE MORE THAN 4000 MG OF TYLENOL in a 24 hour period. You received IV Toradol for pain management at 9:30AM. Please DO NOT take Motrin/Ibuprofen/Advil/Aleve/NSAIDs (Non-Steroidal Anti-Inflammatory Drugs) for the next 6 hours (until 3:30PM).

## 2025-07-15 PROBLEM — K82.4 CHOLESTEROLOSIS OF GALLBLADDER: Chronic | Status: ACTIVE | Noted: 2025-07-09

## 2025-07-15 PROBLEM — E04.2 NONTOXIC MULTINODULAR GOITER: Chronic | Status: ACTIVE | Noted: 2025-07-09

## 2025-07-15 PROBLEM — C34.90 MALIGNANT NEOPLASM OF UNSPECIFIED PART OF UNSPECIFIED BRONCHUS OR LUNG: Chronic | Status: ACTIVE | Noted: 2025-07-09

## 2025-07-24 LAB — SURGICAL PATHOLOGY STUDY: SIGNIFICANT CHANGE UP

## 2025-07-29 ENCOUNTER — TRANSCRIPTION ENCOUNTER (OUTPATIENT)
Age: 65
End: 2025-07-29

## 2025-08-01 ENCOUNTER — NON-APPOINTMENT (OUTPATIENT)
Age: 65
End: 2025-08-01

## 2025-08-01 ENCOUNTER — APPOINTMENT (OUTPATIENT)
Dept: SURGICAL ONCOLOGY | Facility: CLINIC | Age: 65
End: 2025-08-01
Payer: COMMERCIAL

## 2025-08-01 VITALS
HEART RATE: 72 BPM | WEIGHT: 230 LBS | DIASTOLIC BLOOD PRESSURE: 84 MMHG | OXYGEN SATURATION: 97 % | SYSTOLIC BLOOD PRESSURE: 130 MMHG | RESPIRATION RATE: 17 BRPM | TEMPERATURE: 97.3 F | BODY MASS INDEX: 38.32 KG/M2 | HEIGHT: 65 IN

## 2025-08-01 DIAGNOSIS — K82.4 CHOLESTEROLOSIS OF GALLBLADDER: ICD-10-CM

## 2025-08-01 DIAGNOSIS — K81.9 CHOLECYSTITIS, UNSPECIFIED: ICD-10-CM

## 2025-08-01 PROCEDURE — 99024 POSTOP FOLLOW-UP VISIT: CPT

## 2025-08-08 PROBLEM — K81.9 CHOLECYSTITIS: Status: ACTIVE | Noted: 2025-08-08

## 2025-08-08 PROBLEM — K82.4 GALLBLADDER POLYP: Status: RESOLVED | Noted: 2025-07-01 | Resolved: 2025-08-08

## (undated) DEVICE — LIGASURE IMPACT

## (undated) DEVICE — NDL COUNTER FOAM AND MAGNET 40-70

## (undated) DEVICE — SUT SOFSILK 3-0 18" V-20 (POP-OFF)

## (undated) DEVICE — ENDOCATCH 10MM

## (undated) DEVICE — XI ARM PERMANENT CAUTERY HOOK

## (undated) DEVICE — BLADE SURGICAL #15 CARBON

## (undated) DEVICE — DRAPE IOBAN 23" X 23"

## (undated) DEVICE — DRAPE INSTRUMENT POUCH 6.75" X 11"

## (undated) DEVICE — XI ARM FORCEP FENESTRATED BIPOLAR 8MM

## (undated) DEVICE — XI SCISSOR TIP COVER

## (undated) DEVICE — XI ARM DISSECTOR CURVED BIPOLAR 8MM

## (undated) DEVICE — SUT SURGIPRO 0 30" GS-22

## (undated) DEVICE — Device

## (undated) DEVICE — ENDOCATCH GENERAL 15MM (PURPLE)

## (undated) DEVICE — BASIN SET SINGLE

## (undated) DEVICE — TUBING SUCTION 20FT

## (undated) DEVICE — WARMING BLANKET LOWER ADULT

## (undated) DEVICE — SUT PDS II 2-0 27" SH

## (undated) DEVICE — SUT VICRYL 0 27" UR-6

## (undated) DEVICE — PREP CHLORAPREP HI-LITE ORANGE 26ML

## (undated) DEVICE — XI ARM CLIP APPLIER SMALL

## (undated) DEVICE — XI ARM FORCEP CADIERE 8MM

## (undated) DEVICE — XI ARM CLIP APPLIER MEDIUM-LARGE

## (undated) DEVICE — FOLEY TRAY 16FR 5CC LF LUBRISIL ADVANCE TEMP CLOSED

## (undated) DEVICE — SUT POLYSORB 0 60" TIES UNDYED

## (undated) DEVICE — TUBING AIRSEAL TRI-LUMEN FILTERED

## (undated) DEVICE — BLADE SURGICAL #10 STAINLESS

## (undated) DEVICE — XI ARM GRASPER BIPOLAR LONG 8MM

## (undated) DEVICE — XI ARM NEEDLE DRIVER LARGE

## (undated) DEVICE — DRAPE MAYO STAND 30"

## (undated) DEVICE — ELCTR BOVIE PENCIL SMOKE EVACUATION

## (undated) DEVICE — SUT SOFSILK 2-0 18" C-23

## (undated) DEVICE — XI ARM SCISSOR MONO CURVED

## (undated) DEVICE — STAPLER COVIDIEN ENDO GIA STANDARD HANDLE

## (undated) DEVICE — SOL IRR POUR NS 0.9% 500ML

## (undated) DEVICE — TUBING INSUFFLATION LAP FILTER 10FT

## (undated) DEVICE — PACK ROBOTIC LIJ

## (undated) DEVICE — XI DRAPE COLUMN

## (undated) DEVICE — DRAIN RESERVOIR FOR JACKSON PRATT 100CC CARDINAL

## (undated) DEVICE — XI ARM PERMANENT CAUTERY SPATULA

## (undated) DEVICE — GOWN XL

## (undated) DEVICE — PACK ROBOTIC

## (undated) DEVICE — SUT POLYSORB 2-0 30" V-20 UNDYED

## (undated) DEVICE — SUT BIOSYN 4-0 18" P-12

## (undated) DEVICE — TUBING IV SET MICROCLAVE ADAPTER

## (undated) DEVICE — XI DRAPE ARM

## (undated) DEVICE — SUT SOFSILK 3-0 30" V-20

## (undated) DEVICE — SUT SOFSILK 2-0 18" V-20 (POP-OFF)

## (undated) DEVICE — DRSG GAUZE PACKTNER ROLL

## (undated) DEVICE — APPLICATOR FOR ARISTA XL 38CM

## (undated) DEVICE — SUT POLYSORB 4-0 P-12 UNDYED

## (undated) DEVICE — XI ARM GRASPER TIP UP FENESTRATED

## (undated) DEVICE — DRAPE LIGHT HANDLE COVER (BLUE)

## (undated) DEVICE — WARMING BLANKET FULL UNDERBODY

## (undated) DEVICE — POSITIONER FOAM EGG CRATE ULNAR 2PCS (PINK)

## (undated) DEVICE — XI SEAL UNIVERSIAL 5-12MM

## (undated) DEVICE — FOLEY TRAY 16FR 5CC LTX UMETER CLOSED

## (undated) DEVICE — DRAIN JACKSON PRATT 10MM FLAT FULL NO TROCAR

## (undated) DEVICE — ENDOCATCH II 15MM

## (undated) DEVICE — XI OBTURATOR OPTICAL BLADELESS 8MM

## (undated) DEVICE — DRSG DERMABOND 0.7ML

## (undated) DEVICE — WARMING BLANKET UPPER ADULT

## (undated) DEVICE — DRSG CURITY GAUZE SPONGE 4 X 4" 12-PLY

## (undated) DEVICE — VENODYNE/SCD SLEEVE CALF MEDIUM

## (undated) DEVICE — D HELP - CLEARVIEW CLEARIFY SYSTEM

## (undated) DEVICE — XI ARM FORCEP PROGRASP 8MM

## (undated) DEVICE — TROCAR SURGIQUEST AIRSEAL 12MMX100MM

## (undated) DEVICE — SUT SOFSILK 2-0 30" V-20

## (undated) DEVICE — SUT MONOCRYL 4-0 27" PS-2 UNDYED

## (undated) DEVICE — LUBRICANT INST ELECTROLUBE Z SOLUTION

## (undated) DEVICE — BLADE SCALPEL SAFETYLOCK #15

## (undated) DEVICE — TAPE SILK 3"

## (undated) DEVICE — SPECIMEN CONTAINER 100ML

## (undated) DEVICE — POSITIONER FOAM HEAD CRADLE (PINK)

## (undated) DEVICE — GRASPER LAPA 5MMX35CM

## (undated) DEVICE — SOL IRR POUR H2O 250ML

## (undated) DEVICE — DRSG STERISTRIPS 0.5 X 4"

## (undated) DEVICE — MARKER ENDO SPOT EX

## (undated) DEVICE — DRSG MASTISOL

## (undated) DEVICE — PACK BASIN SPECIAL PROCEDURE

## (undated) DEVICE — XI ARM FORCEP TENACULUM

## (undated) DEVICE — APPLICATOR FOR PROGEL EXTENDED SPRAY TIP 29CM

## (undated) DEVICE — ELCTR BOVIE TIP BLADE INSULATED 2.75" EDGE

## (undated) DEVICE — DRSG TEGADERM 2.5X3"

## (undated) DEVICE — XI ARM CLIP APPLIER LARGE

## (undated) DEVICE — SUT MAXON 1 96" T-60

## (undated) DEVICE — ENDOCATCH GENERAL 10MM (PURPLE)

## (undated) DEVICE — XI ARM FORCEP MARYLAND BIPOLAR

## (undated) DEVICE — NDL HYPO REGULAR BEVEL 22G X 1.5" (TURQUOISE)

## (undated) DEVICE — XI ARM NEEDLE DRIVER SUTURECUT MEGA 8MM

## (undated) DEVICE — TUBING STRYKEFLOW II SUCTION / IRRIGATOR

## (undated) DEVICE — TROCAR COVIDIEN VERSASTEP PLUS 15MM STANDARD

## (undated) DEVICE — NDL INJ SCLERO INTERJECT 23G

## (undated) DEVICE — DRAPE WARMING SOLUTION 44 X 44"

## (undated) DEVICE — BLADE SCALPEL SAFETYLOCK #10

## (undated) DEVICE — TUBING OLYMPUS INSUFFLATION

## (undated) DEVICE — SUT POLYSORB 3-0 30" V-20 UNDYED

## (undated) DEVICE — SUT POLYSORB 0 36" GU-46

## (undated) DEVICE — SUT SILK 0 24" SH DA

## (undated) DEVICE — XI VESSEL SEALER

## (undated) DEVICE — CHEST DRAIN PLEUR-EVAC DRY/WET ADULT-PEDS SINGLE (QUICK)